# Patient Record
Sex: FEMALE | Race: WHITE | NOT HISPANIC OR LATINO | Employment: STUDENT | URBAN - METROPOLITAN AREA
[De-identification: names, ages, dates, MRNs, and addresses within clinical notes are randomized per-mention and may not be internally consistent; named-entity substitution may affect disease eponyms.]

---

## 2017-03-05 ENCOUNTER — HOSPITAL ENCOUNTER (INPATIENT)
Facility: OTHER | Age: 22
LOS: 2 days | Discharge: HOME | DRG: 872 | End: 2017-03-08
Attending: EMERGENCY MEDICINE | Admitting: HOSPITALIST
Payer: COMMERCIAL

## 2017-03-05 DIAGNOSIS — R50.9 FEVER AND CHILLS: ICD-10-CM

## 2017-03-05 DIAGNOSIS — R11.0 NAUSEA: ICD-10-CM

## 2017-03-05 DIAGNOSIS — A41.9 SEPSIS SECONDARY TO UTI: ICD-10-CM

## 2017-03-05 DIAGNOSIS — N30.01 ACUTE CYSTITIS WITH HEMATURIA: Primary | ICD-10-CM

## 2017-03-05 DIAGNOSIS — N39.0 SEPSIS SECONDARY TO UTI: ICD-10-CM

## 2017-03-05 DIAGNOSIS — N10 ACUTE PYELONEPHRITIS: ICD-10-CM

## 2017-03-05 DIAGNOSIS — R00.0 TACHYCARDIA: ICD-10-CM

## 2017-03-05 LAB
ALBUMIN SERPL BCP-MCNC: 4.1 G/DL
ALP SERPL-CCNC: 80 U/L
ALT SERPL W/O P-5'-P-CCNC: 34 U/L
ANION GAP SERPL CALC-SCNC: 9 MMOL/L
AST SERPL-CCNC: 29 U/L
B-HCG UR QL: NEGATIVE
BACTERIA #/AREA URNS HPF: ABNORMAL /HPF
BASOPHILS # BLD AUTO: 0.05 K/UL
BASOPHILS NFR BLD: 0.6 %
BILIRUB SERPL-MCNC: 0.5 MG/DL
BILIRUB UR QL STRIP: NEGATIVE
BUN SERPL-MCNC: 9 MG/DL
CALCIUM SERPL-MCNC: 9.9 MG/DL
CHLORIDE SERPL-SCNC: 106 MMOL/L
CLARITY UR: ABNORMAL
CO2 SERPL-SCNC: 25 MMOL/L
COLOR UR: YELLOW
CREAT SERPL-MCNC: 0.9 MG/DL
CTP QC/QA: YES
DIFFERENTIAL METHOD: ABNORMAL
EOSINOPHIL # BLD AUTO: 0.1 K/UL
EOSINOPHIL NFR BLD: 0.7 %
ERYTHROCYTE [DISTWIDTH] IN BLOOD BY AUTOMATED COUNT: 13.8 %
EST. GFR  (AFRICAN AMERICAN): >60 ML/MIN/1.73 M^2
EST. GFR  (NON AFRICAN AMERICAN): >60 ML/MIN/1.73 M^2
GLUCOSE SERPL-MCNC: 96 MG/DL
GLUCOSE UR QL STRIP: NEGATIVE
HCT VFR BLD AUTO: 40.3 %
HGB BLD-MCNC: 13.4 G/DL
HGB UR QL STRIP: ABNORMAL
INR PPP: 0.9
KETONES UR QL STRIP: NEGATIVE
LEUKOCYTE ESTERASE UR QL STRIP: ABNORMAL
LIPASE SERPL-CCNC: 35 U/L
LYMPHOCYTES # BLD AUTO: 1.4 K/UL
LYMPHOCYTES NFR BLD: 15.3 %
MCH RBC QN AUTO: 29.1 PG
MCHC RBC AUTO-ENTMCNC: 33.3 %
MCV RBC AUTO: 87 FL
MICROSCOPIC COMMENT: ABNORMAL
MONOCYTES # BLD AUTO: 0.8 K/UL
MONOCYTES NFR BLD: 8.6 %
NEUTROPHILS # BLD AUTO: 6.6 K/UL
NEUTROPHILS NFR BLD: 74.5 %
NITRITE UR QL STRIP: NEGATIVE
NON-SQ EPI CELLS #/AREA URNS HPF: 2 /HPF
PH UR STRIP: 7 [PH] (ref 5–8)
PLATELET # BLD AUTO: 211 K/UL
PMV BLD AUTO: 10.9 FL
POTASSIUM SERPL-SCNC: 4.2 MMOL/L
PROT SERPL-MCNC: 7.8 G/DL
PROT UR QL STRIP: NEGATIVE
PROTHROMBIN TIME: 9.8 SEC
RBC # BLD AUTO: 4.61 M/UL
RBC #/AREA URNS HPF: 9 /HPF (ref 0–4)
SODIUM SERPL-SCNC: 140 MMOL/L
SP GR UR STRIP: 1.01 (ref 1–1.03)
SQUAMOUS #/AREA URNS HPF: 3 /HPF
URN SPEC COLLECT METH UR: ABNORMAL
UROBILINOGEN UR STRIP-ACNC: NEGATIVE EU/DL
WBC # BLD AUTO: 8.8 K/UL
WBC #/AREA URNS HPF: 33 /HPF (ref 0–5)
WBC CLUMPS URNS QL MICRO: ABNORMAL

## 2017-03-05 PROCEDURE — 87088 URINE BACTERIA CULTURE: CPT

## 2017-03-05 PROCEDURE — 25500020 PHARM REV CODE 255: Performed by: EMERGENCY MEDICINE

## 2017-03-05 PROCEDURE — 96375 TX/PRO/DX INJ NEW DRUG ADDON: CPT

## 2017-03-05 PROCEDURE — 81000 URINALYSIS NONAUTO W/SCOPE: CPT

## 2017-03-05 PROCEDURE — 25000003 PHARM REV CODE 250: Performed by: INTERNAL MEDICINE

## 2017-03-05 PROCEDURE — 87186 SC STD MICRODIL/AGAR DIL: CPT

## 2017-03-05 PROCEDURE — 63600175 PHARM REV CODE 636 W HCPCS: Performed by: EMERGENCY MEDICINE

## 2017-03-05 PROCEDURE — 80053 COMPREHEN METABOLIC PANEL: CPT

## 2017-03-05 PROCEDURE — 87040 BLOOD CULTURE FOR BACTERIA: CPT

## 2017-03-05 PROCEDURE — 85610 PROTHROMBIN TIME: CPT

## 2017-03-05 PROCEDURE — G0378 HOSPITAL OBSERVATION PER HR: HCPCS

## 2017-03-05 PROCEDURE — 99285 EMERGENCY DEPT VISIT HI MDM: CPT | Mod: 25

## 2017-03-05 PROCEDURE — 25000003 PHARM REV CODE 250: Performed by: EMERGENCY MEDICINE

## 2017-03-05 PROCEDURE — 87086 URINE CULTURE/COLONY COUNT: CPT

## 2017-03-05 PROCEDURE — 96367 TX/PROPH/DG ADDL SEQ IV INF: CPT

## 2017-03-05 PROCEDURE — 96376 TX/PRO/DX INJ SAME DRUG ADON: CPT

## 2017-03-05 PROCEDURE — 96361 HYDRATE IV INFUSION ADD-ON: CPT

## 2017-03-05 PROCEDURE — 85025 COMPLETE CBC W/AUTO DIFF WBC: CPT

## 2017-03-05 PROCEDURE — 96365 THER/PROPH/DIAG IV INF INIT: CPT

## 2017-03-05 PROCEDURE — 83690 ASSAY OF LIPASE: CPT

## 2017-03-05 PROCEDURE — 81025 URINE PREGNANCY TEST: CPT | Performed by: EMERGENCY MEDICINE

## 2017-03-05 PROCEDURE — 87077 CULTURE AEROBIC IDENTIFY: CPT

## 2017-03-05 PROCEDURE — 99223 1ST HOSP IP/OBS HIGH 75: CPT | Mod: ,,, | Performed by: HOSPITALIST

## 2017-03-05 RX ORDER — OXYCODONE HYDROCHLORIDE 5 MG/1
5 TABLET ORAL EVERY 4 HOURS PRN
Status: DISCONTINUED | OUTPATIENT
Start: 2017-03-05 | End: 2017-03-08 | Stop reason: HOSPADM

## 2017-03-05 RX ORDER — MORPHINE SULFATE 4 MG/ML
4 INJECTION, SOLUTION INTRAMUSCULAR; INTRAVENOUS EVERY 6 HOURS PRN
Status: DISCONTINUED | OUTPATIENT
Start: 2017-03-05 | End: 2017-03-06

## 2017-03-05 RX ORDER — NORGESTIMATE AND ETHINYL ESTRADIOL 7DAYSX3 28
1 KIT ORAL DAILY
COMMUNITY

## 2017-03-05 RX ORDER — ONDANSETRON 2 MG/ML
8 INJECTION INTRAMUSCULAR; INTRAVENOUS EVERY 8 HOURS PRN
Status: DISCONTINUED | OUTPATIENT
Start: 2017-03-05 | End: 2017-03-08 | Stop reason: HOSPADM

## 2017-03-05 RX ORDER — ACETAMINOPHEN 500 MG
1000 TABLET ORAL
Status: COMPLETED | OUTPATIENT
Start: 2017-03-05 | End: 2017-03-05

## 2017-03-05 RX ORDER — ACETAMINOPHEN 325 MG/1
650 TABLET ORAL EVERY 6 HOURS PRN
Status: DISCONTINUED | OUTPATIENT
Start: 2017-03-05 | End: 2017-03-08 | Stop reason: HOSPADM

## 2017-03-05 RX ORDER — CEFAZOLIN SODIUM 1 G/50ML
1 SOLUTION INTRAVENOUS
Status: COMPLETED | OUTPATIENT
Start: 2017-03-05 | End: 2017-03-05

## 2017-03-05 RX ORDER — SODIUM CHLORIDE 9 MG/ML
INJECTION, SOLUTION INTRAVENOUS CONTINUOUS
Status: DISCONTINUED | OUTPATIENT
Start: 2017-03-05 | End: 2017-03-07

## 2017-03-05 RX ORDER — ONDANSETRON 2 MG/ML
4 INJECTION INTRAMUSCULAR; INTRAVENOUS
Status: COMPLETED | OUTPATIENT
Start: 2017-03-05 | End: 2017-03-05

## 2017-03-05 RX ORDER — HYDROMORPHONE HYDROCHLORIDE 1 MG/ML
0.5 INJECTION, SOLUTION INTRAMUSCULAR; INTRAVENOUS; SUBCUTANEOUS
Status: COMPLETED | OUTPATIENT
Start: 2017-03-05 | End: 2017-03-05

## 2017-03-05 RX ORDER — MORPHINE SULFATE 2 MG/ML
2 INJECTION, SOLUTION INTRAMUSCULAR; INTRAVENOUS
Status: COMPLETED | OUTPATIENT
Start: 2017-03-05 | End: 2017-03-05

## 2017-03-05 RX ORDER — HYDROMORPHONE HYDROCHLORIDE 1 MG/ML
0.5 INJECTION, SOLUTION INTRAMUSCULAR; INTRAVENOUS; SUBCUTANEOUS
Status: DISCONTINUED | OUTPATIENT
Start: 2017-03-05 | End: 2017-03-05

## 2017-03-05 RX ORDER — CIPROFLOXACIN 2 MG/ML
400 INJECTION, SOLUTION INTRAVENOUS
Status: COMPLETED | OUTPATIENT
Start: 2017-03-05 | End: 2017-03-05

## 2017-03-05 RX ORDER — IBUPROFEN 400 MG/1
800 TABLET ORAL
Status: COMPLETED | OUTPATIENT
Start: 2017-03-05 | End: 2017-03-05

## 2017-03-05 RX ORDER — CIPROFLOXACIN 2 MG/ML
400 INJECTION, SOLUTION INTRAVENOUS
Status: DISCONTINUED | OUTPATIENT
Start: 2017-03-06 | End: 2017-03-06

## 2017-03-05 RX ADMIN — MORPHINE SULFATE 2 MG: 2 INJECTION, SOLUTION INTRAMUSCULAR; INTRAVENOUS at 05:03

## 2017-03-05 RX ADMIN — IBUPROFEN 800 MG: 400 TABLET, FILM COATED ORAL at 09:03

## 2017-03-05 RX ADMIN — MORPHINE SULFATE 2 MG: 2 INJECTION, SOLUTION INTRAMUSCULAR; INTRAVENOUS at 02:03

## 2017-03-05 RX ADMIN — ONDANSETRON 4 MG: 2 INJECTION, SOLUTION INTRAMUSCULAR; INTRAVENOUS at 09:03

## 2017-03-05 RX ADMIN — ACETAMINOPHEN 1000 MG: 500 TABLET ORAL at 06:03

## 2017-03-05 RX ADMIN — CEFAZOLIN SODIUM 1 G: 1 SOLUTION INTRAVENOUS at 09:03

## 2017-03-05 RX ADMIN — SODIUM CHLORIDE 1000 ML: 0.9 INJECTION, SOLUTION INTRAVENOUS at 02:03

## 2017-03-05 RX ADMIN — ONDANSETRON 4 MG: 2 INJECTION INTRAMUSCULAR; INTRAVENOUS at 02:03

## 2017-03-05 RX ADMIN — CIPROFLOXACIN 400 MG: 2 INJECTION, SOLUTION INTRAVENOUS at 07:03

## 2017-03-05 RX ADMIN — OXYCODONE HYDROCHLORIDE 5 MG: 5 TABLET ORAL at 11:03

## 2017-03-05 RX ADMIN — HYDROMORPHONE HYDROCHLORIDE 0.5 MG: 1 INJECTION, SOLUTION INTRAMUSCULAR; INTRAVENOUS; SUBCUTANEOUS at 06:03

## 2017-03-05 RX ADMIN — IOHEXOL 75 ML: 350 INJECTION, SOLUTION INTRAVENOUS at 04:03

## 2017-03-05 RX ADMIN — HYDROMORPHONE HYDROCHLORIDE 0.5 MG: 1 INJECTION, SOLUTION INTRAMUSCULAR; INTRAVENOUS; SUBCUTANEOUS at 07:03

## 2017-03-05 RX ADMIN — SODIUM CHLORIDE: 0.9 INJECTION, SOLUTION INTRAVENOUS at 11:03

## 2017-03-05 NOTE — ED NOTES
Rounding on the patient has been done. she has been updated on the plan of care and her current status. Pain was assessed and is currently a 5/10. Comfort positioning and restroom needs were addressed. Necessary items were placed with in her reach and she was advised when a reassessment would take place. The call bell remains at the bedside for any additional patient needs. The patient is resting comfortably on the stretcher, respirations are even and unlabored, skin warm and dry. Will continue to monitor.

## 2017-03-05 NOTE — ED TRIAGE NOTES
Pt states diffuse abdominal pain since yesterday afternoon - pt woke at 0300 with severe right sided abdominal pain and some low back pain on the right side - no appetite and nauseated- states some constipation over last 2 days - abdomen hurt worse while driving here and going over bumps

## 2017-03-05 NOTE — ED PROVIDER NOTES
Encounter Date: 3/5/2017    SCRIBE #1 NOTE: I, Kang Saab, am scribing for, and in the presence of, Dr. Camp.       History     Chief Complaint   Patient presents with    Abdominal Pain     pt reports RLQ pain that started last night with a fever last night that has resolved at this time; nausea but denies any vomiting/diarrhea; denies any dysuria/hematuria; pt was seen at [facility] and was told to come to ED to r/o appendicitis     Review of patient's allergies indicates:  No Known Allergies  HPI Comments: Time seen by provider: 2:20 PM    This is a 21 y.o. female who presents to the ED with a chief complaint of RLQ pain. The patient states that the pain woke her from sleep last night. She reports that last night her pain was generalized but today has became localized to her RLQ. Her pain is described as sharp and rated at an 8/10 in severity currently. She notes it has worsened since onset. The patient also complains of nausea and constipation since onset last night. She reports a hx of celiac disease but denies any similarity to past episodes. The patient denies any vomiting, dysuria, urinary frequency, or urinary urgency. She was sent from an urgent care clinic with concerns for appendicitis. She reports no hx of ovarian cysts. No known allergies reported. Past dx of pyelonephritis noted.     The history is provided by the patient.     Past Medical History:   Diagnosis Date    Celiac disease      Past Surgical History:   Procedure Laterality Date    LEG SURGERY       History reviewed. No pertinent family history.  Social History   Substance Use Topics    Smoking status: Never Smoker    Smokeless tobacco: None    Alcohol use Yes      Comment: social     Review of Systems   Constitutional: Negative for chills and fever.   HENT: Negative for congestion and sore throat.    Eyes: Negative for photophobia and redness.   Respiratory: Negative for cough and shortness of breath.    Cardiovascular:  Negative for chest pain.   Gastrointestinal: Positive for abdominal pain, constipation and nausea. Negative for vomiting.   Genitourinary: Negative for dysuria, frequency and urgency.   Musculoskeletal: Negative for back pain.   Skin: Negative for rash.   Neurological: Negative for weakness, light-headedness and headaches.   Psychiatric/Behavioral: Negative for confusion.       Physical Exam   Initial Vitals   BP Pulse Resp Temp SpO2   03/05/17 1347 03/05/17 1347 03/05/17 1347 03/05/17 1347 03/05/17 1347   121/89 121 18 98.4 °F (36.9 °C) 100 %     Physical Exam    Nursing note and vitals reviewed.  Constitutional: She appears well-developed and well-nourished. She is not diaphoretic. No distress.   HENT:   Head: Normocephalic and atraumatic.   Mouth/Throat: Oropharynx is clear and moist.   Eyes: Conjunctivae and EOM are normal. Pupils are equal, round, and reactive to light. No scleral icterus.   Neck: Normal range of motion. Neck supple.   Cardiovascular: Normal rate, regular rhythm, S1 normal, S2 normal and normal heart sounds. Exam reveals no gallop and no friction rub.    No murmur heard.  Pulmonary/Chest: Breath sounds normal. No respiratory distress. She has no wheezes. She has no rhonchi. She has no rales.   Abdominal: Soft. Bowel sounds are normal. There is no rebound and no guarding.   TTP at McBurney's Point.    Musculoskeletal: Normal range of motion. She exhibits no edema or tenderness.   No lower extremity edema.    Lymphadenopathy:     She has no cervical adenopathy.   Neurological: She is alert and oriented to person, place, and time.   Skin: Skin is warm and dry. No rash noted. No pallor.   Psychiatric: She has a normal mood and affect. Her behavior is normal. Judgment and thought content normal.         ED Course   Procedures  Labs Reviewed   URINALYSIS - Abnormal; Notable for the following:        Result Value    Appearance, UA Hazy (*)     Occult Blood UA 1+ (*)     Leukocytes, UA Trace (*)      All other components within normal limits   CBC W/ AUTO DIFFERENTIAL - Abnormal; Notable for the following:     Gran% 74.5 (*)     Lymph% 15.3 (*)     All other components within normal limits   URINALYSIS MICROSCOPIC - Abnormal; Notable for the following:     RBC, UA 9 (*)     WBC, UA 33 (*)     WBC Clumps, UA Moderate (*)     Bacteria, UA Moderate (*)     Non-Squam Epith 2 (*)     All other components within normal limits   CULTURE, URINE   CULTURE, BLOOD   CULTURE, BLOOD   COMPREHENSIVE METABOLIC PANEL   LIPASE   PROTIME-INR   POCT URINE PREGNANCY        Imaging Results         US Pelvis Comp with Transvag NON-OB (xpd) (Final result) Result time:  03/05/17 19:41:08    Procedure changed from US Pelvis Complete Non OB        Final result by Nick Gutierrez MD (03/05/17 19:41:08)    Impression:       No definitive corpus luteum cyst as suggested on the previous CT scan of the abdomen and pelvis.    Small amount of fluid within the pelvis, otherwise unremarkable pelvic ultrasound.              Electronically signed by: NICK GUTIERREZ MD  Date:     03/05/17  Time:    19:41     Narrative:    Exam: [id number]  03/05/17  18:34:05 [id number] ([facility]) : US PELVIS COMP WITH TRANSVAG NON-OB (XPD)    Technique:    Transabdominal and transvaginal pelvic ultrasound was performed.    Comparison:    CT scan of the abdomen and pelvis dated 3/5/11    Findings:      The uterus measures 7.4 x 4.3 x 4.6 cm.  The endometrial stripe measures 6 mm.  No intrauterine gestation is identified.  The cervix is unremarkable.    The right ovary measures 3.0 x 2.0 x 2.4 cm.  The left ovary measures 3.4 x 1.7 x 2.4 cm.  There is normal arterial and venous flow to both ovaries.    Small amount of free fluid is present in the pelvis.            CT Abdomen Pelvis With Contrast (Final result)    Abnormal Result time:  03/05/17 17:58:28    Final result by Nick Gutierrez MD (03/05/17 17:58:28)    Impression:       No CT evidence of acute appendicitis.    Mild loss of  the corticomedullary differentiation involving the right kidney with associated right-sided uroepithelial thickening.  The findings are most suggestive of pyelonephritis.  Suggest clinical correlation.    Small right corpus luteum cyst.    Report has been flagged in the EPIC medical record system.          Electronically signed by: BULL HU MD  Date:     03/05/17  Time:    17:58     Narrative:    Exam: [id number]  03/05/17  16:16:02 [id number] ([facility]) : CT ABDOMEN PELVIS WITH CONTRAST    Technique:    Axial CT Scan of the abdomen and pelvis was performed from the lung base to the public symphysis after the intravenous administration of  75 cc of Omni 350. Coronal and Sagittal reformats were obtained.     Comparison:    CT scan of the abdomen and pelvis dated 10/21/14.    Findings:      There is a 5 mm pleural-based nodule in the right lung base.  The remaining lung bases are within normal limits.  The heart is normal in appearance.  No pericardial effusions identified.  The vessels are unremarkable.  There is no evidence of lymphadenopathy in the abdomen or pelvis.    The esophagus, stomach, duodenum, and small bowel are within normal limits.  The appendix is within normal limits.  The large bowel is normal in appearance.    The liver, gallbladder, and biliary system are within normal limits.  The spleen, pancreas, and adrenal glands are within normal limits.    There is mild loss of the corticomedullary differentiation involving the right kidney.  There is uroepithelial thickening involving the right ureter.  The left kidney and left ureter are within normal limits.  The urinary bladder is distended.  The right adnexa is prominent with questionable right corpus luteum cyst.    There is no evidence of free fluid in the abdomen or pelvis.  There is no evidence of free air.  There is no evidence of pneumatosis    There is mild scoliosis of the thoracolumbar spine.  No fractures identified.  The abdominal wall is  unremarkable.                 Medical Decision Making:   ED Management:  6:38 PM: Upon revaluation, the patient's temp was 103. ordered tylenol, 0.25 of Dilaudid, and 400 mg IV Cipro, and pelvic US secondary to CT scan with right adnexal fullness.     9:03 PM: Consulted and discussed the patient's case with the moonlighter who will see the patient in the ED.            Scribe Attestation:   Scribe #1: I performed the above scribed service and the documentation accurately describes the services I performed. I attest to the accuracy of the note.    Attending Attestation:           Physician Attestation for Scribe:  Physician Attestation Statement for Scribe #1: I, Dr. Cedeno, reviewed documentation, as scribed by Kang Saab in my presence, and it is both accurate and complete.         Attending ED Notes:   Emergent evaluation a 21-year-old female with complaint of right lower quadrant abdominal pain.  Patient is initially afebrile with right lower quadrant abdominal pain and McBurney's point.  Patient has no elevation of white blood cell count.  H&H within normal limits.  No acute findings on CMP.  Urinary analysis reveals moderate white blood cell clumps moderate bacteria.  CT scan reveals no evidence of acute appendicitis.  CT does reveal right-sided pyelonephritis with uroepithelial thickening.  Possible small right corpus luteum cyst.  Ultrasound of the pelvis is without any acute findings.  No visualization of corpus luteum cyst.  While in the emergency department patient became tachycardic and developed a fever 103.  Patient received both Tylenol and ibuprofen for control of fever.  Patient also received IV pain medicine for control.  Despite treatment in emergency department patient has intractable pain and nausea.  The patient is extensively counseled on her diagnosis and treatment and admitted in stable condition.      9:30pm Consulted and discussed patient with the moonlighter on call who will admit the  patient.           ED Course     Clinical Impression:     1. Acute cystitis with hematuria    2. Fever and chills    3. Tachycardia    4. Nausea                Gelacio Boggs MD  03/05/17 5125

## 2017-03-05 NOTE — IP AVS SNAPSHOT
[facility] Location ([facility])  [address] [place], [place] [address]  Phone: [phone]           Patient Discharge Instructions     Our goal is to set you up for success. This packet includes information on your condition, medications, and your home care. It will help you to care for yourself so you don't get sicker and need to go back to the hospital.     Please ask your nurse if you have any questions.        There are many details to remember when preparing to leave the hospital. Here is what you will need to do:    1. Take your medicine. If you are prescribed medications, review your Medication List in the following pages. You may have new medications to  at the pharmacy and others that you'll need to stop taking. Review the instructions for how and when to take your medications. Talk with your doctor or nurses if you are unsure of what to do.     2. Go to your follow-up appointments. Specific follow-up information is listed in the following pages. Your may be contacted by a transition nurse or clinical provider about future appointments. Be sure we have all of the phone numbers to reach you, if needed. Please contact your provider's office if you are unable to make an appointment.     3. Watch for warning signs. Your doctor or nurse will give you detailed warning signs to watch for and when to call for assistance. These instructions may also include educational information about your condition. If you experience any of warning signs to your health, call your doctor.               [facility]  Unless otherwise directed by your provider, please contact [facility], our nurse care line that is available for 24/7 assistance.     [phone] (toll-free)    Registered nurses in the [facility] provide clinical advisement, health education, appointment booking, and other advisory services.                    ** Verify the list of medication(s) below is accurate and up to  date. Carry this with you in case of emergency. If your medications have changed, please notify your healthcare provider.             Medication List      START taking these medications        Additional Info                      ciprofloxacin HCl 500 MG tablet   Commonly known as:  CIPRO   Quantity:  20 tablet   Refills:  0   Dose:  500 mg   Indications:  UTI pyelonephritis    Last time this was given:  500 mg on 3/8/2017 11:55 AM   Instructions:  Take 1 tablet (500 mg total) by mouth every 12 (twelve) hours.     Begin Date    AM    Noon    PM    Bedtime         CONTINUE taking these medications        Additional Info                      norgestimate-ethinyl estradiol 0.18/0.215/0.25 mg-35 mcg (28) tablet   Commonly known as:  ORTHO TRI-CYCLEN,TRI-SPRINTEC   Refills:  0   Dose:  1 tablet    Instructions:  Take 1 tablet by mouth once daily.     Begin Date    AM    Noon    PM    Bedtime            Where to Get Your Medications      These medications were sent to [facility] [place], [place] - [address]  [address], [place] [address]     Phone:  [phone]     ciprofloxacin HCl 500 MG tablet                  Please bring to all follow up appointments:    1. A copy of your discharge instructions.  2. All medicines you are currently taking in their original bottles.  3. Identification and insurance card.    Please arrive 15 minutes ahead of scheduled appointment time.    Please call 24 hours in advance if you must reschedule your appointment and/or time.        Follow-up Information     Follow up with [facility]. Schedule an appointment as soon as possible for a visit in 3 days.    Contact information:    [place] [address] [phone]          Discharge Instructions     Future Orders    Call MD for:  difficulty breathing or increased cough     Call MD for:  increased confusion or weakness     Call MD for:  persistent  "dizziness, light-headedness, or visual disturbances     Call MD for:  persistent nausea and vomiting or diarrhea     Call MD for:  severe persistent headache     Call MD for:  severe uncontrolled pain     Call MD for:  temperature >100.4     Call MD for:  worsening rash     Diet general     Questions:    Total calories:      Fat restriction, if any:      Protein restriction, if any:      Na restriction, if any:      Fluid restriction:      Additional restrictions:        Discharge References/Attachments     PYELONEPHRITIS, FEMALE (ADULT) (ENGLISH)    BLADDER INFECTION, FEMALE (ADULT) (ENGLISH)        Primary Diagnosis     Your primary diagnosis was:  Urinary Sepsis      Admission Information     Date & Time Provider Department CSN    3/5/2017  2:12 PM Bird Pond MD [facility] [id number]      Care Providers     Provider Role Specialty Primary office phone    iBrd Pond MD Attending Provider Hospitalist [phone]      Your Vitals Were     BP Pulse Temp Resp Height Weight    112/64 (BP Location: Left arm, Patient Position: Lying, BP Method: Automatic) 77 98.5 °F (36.9 °C) (Oral) 18 5' 10" (1.778 m) 68 kg (150 lb)    Last Period SpO2 BMI          02/19/2017 98% 21.52 kg/m2        Recent Lab Values     No lab values to display.      Pending Labs     Order Current Status    Blood Culture #1 **CANNOT BE ORDERED STAT** Preliminary result    Blood Culture #2 **CANNOT BE ORDERED STAT** Preliminary result    Blood culture Preliminary result    Blood culture Preliminary result      Allergies as of 3/8/2017     No Known Allergies      Advance Directives     An advance directive is a document which, in the event you are no longer able to make decisions for yourself, tells your healthcare team what kind of treatment you do or do not want to receive, or who you would like to make those decisions for you.  If you do not currently have an advance directive, [facility] encourages you to create one.  For more " information call:  [phone]), [phone] ([phone]),  or log on to [email or web address].        Language Assistance Services     ATTENTION: Language assistance services are available, free of charge. Please call [phone].      ATENCIÓN: Si habla español, tiene a fragoso disposición servicios gratuitos de asistencia lingüística. Llame al [phone].     CHÚ Ý: N?u b?n nói Ti?ng Vi?t, có các d?ch v? h? tr? ngôn ng? mi?n phí dành cho b?n. G?i s? [phone].         [facility] complies with applicable Federal civil rights laws and does not discriminate on the basis of race, color, national origin, age, disability, or sex.

## 2017-03-06 PROBLEM — N39.0 SEPSIS SECONDARY TO UTI: Status: ACTIVE | Noted: 2017-03-06

## 2017-03-06 PROBLEM — N10 ACUTE PYELONEPHRITIS: Status: ACTIVE | Noted: 2017-03-06

## 2017-03-06 PROBLEM — A41.9 SEPSIS SECONDARY TO UTI: Status: ACTIVE | Noted: 2017-03-06

## 2017-03-06 LAB
ALBUMIN SERPL BCP-MCNC: 3.7 G/DL
ALP SERPL-CCNC: 74 U/L
ALT SERPL W/O P-5'-P-CCNC: 34 U/L
ANION GAP SERPL CALC-SCNC: 8 MMOL/L
AST SERPL-CCNC: 27 U/L
BASOPHILS # BLD AUTO: 0.02 K/UL
BASOPHILS NFR BLD: 0.2 %
BILIRUB SERPL-MCNC: 1.1 MG/DL
BUN SERPL-MCNC: 9 MG/DL
CALCIUM SERPL-MCNC: 9.5 MG/DL
CHLORIDE SERPL-SCNC: 105 MMOL/L
CO2 SERPL-SCNC: 24 MMOL/L
CREAT SERPL-MCNC: 1.1 MG/DL
DIFFERENTIAL METHOD: ABNORMAL
EOSINOPHIL # BLD AUTO: 0 K/UL
EOSINOPHIL NFR BLD: 0.1 %
ERYTHROCYTE [DISTWIDTH] IN BLOOD BY AUTOMATED COUNT: 13.9 %
EST. GFR  (AFRICAN AMERICAN): >60 ML/MIN/1.73 M^2
EST. GFR  (NON AFRICAN AMERICAN): >60 ML/MIN/1.73 M^2
GLUCOSE SERPL-MCNC: 93 MG/DL
HCT VFR BLD AUTO: 39.7 %
HGB BLD-MCNC: 12.9 G/DL
LYMPHOCYTES # BLD AUTO: 1.3 K/UL
LYMPHOCYTES NFR BLD: 12.5 %
MCH RBC QN AUTO: 28.3 PG
MCHC RBC AUTO-ENTMCNC: 32.5 %
MCV RBC AUTO: 87 FL
MONOCYTES # BLD AUTO: 0.8 K/UL
MONOCYTES NFR BLD: 8.1 %
NEUTROPHILS # BLD AUTO: 7.9 K/UL
NEUTROPHILS NFR BLD: 78.7 %
PLATELET # BLD AUTO: 168 K/UL
PMV BLD AUTO: 11.1 FL
POTASSIUM SERPL-SCNC: 4.6 MMOL/L
PROT SERPL-MCNC: 7.2 G/DL
RBC # BLD AUTO: 4.56 M/UL
SODIUM SERPL-SCNC: 137 MMOL/L
WBC # BLD AUTO: 10.08 K/UL

## 2017-03-06 PROCEDURE — 11000001 HC ACUTE MED/SURG PRIVATE ROOM

## 2017-03-06 PROCEDURE — 63600175 PHARM REV CODE 636 W HCPCS: Performed by: HOSPITALIST

## 2017-03-06 PROCEDURE — 63600175 PHARM REV CODE 636 W HCPCS: Performed by: INTERNAL MEDICINE

## 2017-03-06 PROCEDURE — 80053 COMPREHEN METABOLIC PANEL: CPT

## 2017-03-06 PROCEDURE — 87040 BLOOD CULTURE FOR BACTERIA: CPT | Mod: 59

## 2017-03-06 PROCEDURE — 25000003 PHARM REV CODE 250: Performed by: HOSPITALIST

## 2017-03-06 PROCEDURE — 85025 COMPLETE CBC W/AUTO DIFF WBC: CPT

## 2017-03-06 PROCEDURE — 36415 COLL VENOUS BLD VENIPUNCTURE: CPT

## 2017-03-06 PROCEDURE — 25000003 PHARM REV CODE 250: Performed by: INTERNAL MEDICINE

## 2017-03-06 RX ORDER — IBUPROFEN 600 MG/1
600 TABLET ORAL ONCE
Status: COMPLETED | OUTPATIENT
Start: 2017-03-06 | End: 2017-03-06

## 2017-03-06 RX ORDER — CEFEPIME HYDROCHLORIDE 2 G/50ML
2 INJECTION, SOLUTION INTRAVENOUS
Status: DISCONTINUED | OUTPATIENT
Start: 2017-03-06 | End: 2017-03-08 | Stop reason: HOSPADM

## 2017-03-06 RX ORDER — ENOXAPARIN SODIUM 100 MG/ML
40 INJECTION SUBCUTANEOUS EVERY 24 HOURS
Status: DISCONTINUED | OUTPATIENT
Start: 2017-03-06 | End: 2017-03-08 | Stop reason: HOSPADM

## 2017-03-06 RX ORDER — HYDROMORPHONE HYDROCHLORIDE 1 MG/ML
0.5 INJECTION, SOLUTION INTRAMUSCULAR; INTRAVENOUS; SUBCUTANEOUS EVERY 6 HOURS PRN
Status: DISCONTINUED | OUTPATIENT
Start: 2017-03-06 | End: 2017-03-08 | Stop reason: HOSPADM

## 2017-03-06 RX ADMIN — OXYCODONE HYDROCHLORIDE 5 MG: 5 TABLET ORAL at 11:03

## 2017-03-06 RX ADMIN — CEFEPIME HYDROCHLORIDE 2 G: 2 INJECTION, SOLUTION INTRAVENOUS at 11:03

## 2017-03-06 RX ADMIN — CIPROFLOXACIN 400 MG: 2 INJECTION, SOLUTION INTRAVENOUS at 10:03

## 2017-03-06 RX ADMIN — OXYCODONE HYDROCHLORIDE 5 MG: 5 TABLET ORAL at 05:03

## 2017-03-06 RX ADMIN — ONDANSETRON 8 MG: 2 INJECTION INTRAMUSCULAR; INTRAVENOUS at 11:03

## 2017-03-06 RX ADMIN — ENOXAPARIN SODIUM 40 MG: 100 INJECTION SUBCUTANEOUS at 11:03

## 2017-03-06 RX ADMIN — IBUPROFEN 600 MG: 600 TABLET, FILM COATED ORAL at 05:03

## 2017-03-06 RX ADMIN — ACETAMINOPHEN 650 MG: 325 TABLET ORAL at 07:03

## 2017-03-06 RX ADMIN — SODIUM CHLORIDE 1000 ML: 0.9 INJECTION, SOLUTION INTRAVENOUS at 12:03

## 2017-03-06 RX ADMIN — ONDANSETRON 8 MG: 2 INJECTION INTRAMUSCULAR; INTRAVENOUS at 05:03

## 2017-03-06 NOTE — NURSING
No significant events overnight. Remains free from fall, injury, and skin breakdown. Voiding independently. VSS on RA throughout the night. Pain well controlled with PO meds. PRN antiemetic administered and well tolerated. Plan of care reviewed with patient and all questions answered. Bed low, locked w/ bed alarm on. Call light within reach. Purposeful rounding performed. Resting comfortably in bed, no other complaints at this time.

## 2017-03-06 NOTE — PLAN OF CARE
SW met with pt at bedside to complete discharge assessment.  Mother, Toshia Doss [phone] is POA no living will.  Pt is a student at [employer] and lives with 4 roommates.  No needs identified at this time.     03/06/17 1011   Discharge Assessment   Assessment Type Discharge Planning Assessment   Confirmed/corrected address and phone number on facesheet? Yes   Assessment information obtained from? Patient   Communicated expected length of stay with patient/caregiver no   Prior to hospitilization cognitive status: Alert/Oriented   Current cognitive status: Alert/Oriented   Current Functional Status: Independent   Arrived From (Urgent Care)   Lives With friend(s)   Able to Return to Prior Arrangements yes   Is patient able to care for self after discharge? Yes   How many people do you have in your home that can help with your care after discharge? 1   Who are your caregiver(s) and their phone number(s)? (Jessi Mitchell, friend, [phone])   Patient's perception of discharge disposition home or selfcare   Readmission Within The Last 30 Days no previous admission in last 30 days   Patient currently being followed by outpatient case management? No   Patient currently receives home health services? No   Does the patient currently use HME? No   Patient currently receives private duty nursing? No   Patient currently receives any other outside agency services? No   Do you have any problems affording any of your prescribed medications? No   Is the patient taking medications as prescribed? yes   Do you have any financial concerns preventing you from receiving the healthcare you need? No   Does the patient have transportation to healthcare appointments? Yes   Transportation Available (friend and personal transportation)   On Dialysis? No   Does the patient receive services at the Coumadin Clinic? No   Are there any open cases? No   Discharge Plan A Home   Patient/Family In Agreement With Plan yes

## 2017-03-06 NOTE — H&P
History & Physical  Hospital Medicine      SUBJECTIVE:     Chief Complaint/Reason for Admission: abdominal pain     History of Present Illness:  Ms. Lisa Pike is a 21 y.o. with a past medical history of celiac disease, prior pyelonephritis/UTI, here with abdominal pain. She states that yesterday she began having sharp , initially intermittent, then constant pain on the right side of her abdomen. She states that the pain became worse overnight, and then became constant. She states that this morning, she went to the store and on her arrival home , felt febrile, took her temperature which resulted as 100.7. She states she felt chills , but no rigors. She complains of nausea, but no emesis.  She states that she initially presented to an urgent care today, but was referred to the ER.     Presently she states that her pain is manageable and her nausea has subsided significantly.         ED course : Tylenol 1000mg  /  Ancef 1gram / Cipro 400mg / Dialudid 0.5mg x 2 / Ibuprofen 800mg / Morphine 2mg x 2 / Zofran 4mg x 2 / 1000 cc 0.9% NaCL   ED vitals:   Initial Vitals   BP Pulse Resp Temp SpO2   03/05/17 1347 03/05/17 1347 03/05/17 1347 03/05/17 1347 03/05/17 1347   121/89 121 18 98.4 °F (36.9 °C) 100 %               Review of patient's allergies indicates:  No Known Allergies    Past Medical History:   Diagnosis Date    Celiac disease        Past Surgical History:   Procedure Laterality Date    LEG SURGERY         History reviewed. No pertinent family history.     Social History     Social History    Marital status: Single     Spouse name: N/A    Number of children: N/A    Years of education: N/A     Social History Main Topics    Smoking status: Never Smoker    Smokeless tobacco: None    Alcohol use Yes      Comment: social    Drug use: No    Sexual activity: Not Asked     Other Topics Concern    None     Social History Narrative       Review of Systems:  Constitutional: + fever ,  no weight  changes.  Eyes: No visual changes or photophobia  HEENT: No nasal congestion or sore throat  Respiratory: No cough or shorness of breath  Cardiovascular: No chest pain or palpitations  Gastrointestinal: + nausea no vomiting, abdominal pain as above   Genitourinary: No hematuria or dysuria  Skin: No rash or pruritis  Hematologic/lymphatic: No easy bruising, bleeding or lymphadenopathy        OBJECTIVE:     Temp:  [98.4 °F (36.9 °C)-103 °F (39.4 °C)] 99.2 °F (37.3 °C)  Pulse:  [] 94  Resp:  [15-20] 17  SpO2:  [97 %-100 %] 97 %  BP: (104-139)/(57-89) 110/58  Body mass index is 21.52 kg/(m^2).     Physical Exam:  General appearance: Well developed, well nourished  HEENT:  Normocephalic, atruamatic, conjunctivae normal, sclarea anictric, PERRL, Mucus membranes moist,  Trachea midline , no JVD   Lungs: Clear to auscultation bilaterally and normal respiratory effort  Heart: Regular rate and rhythm, S1, S2 normal, no murmur, click, rub or gallop  Abdomen: Soft, mildly tender to palpation RLQ , non-distended; bowel sounds normal; no masses, no organomegaly. No CVA tenderness  Extremities: No cyanosis or clubbing. No edema  Pulses: 2+ and symmetric  Skin: Skin color, texture, turgor normal. No rashes or lesions        Laboratory: Reviewed and noted  where applicable- Please see chart for full lab data.  Urine wbc 33 , Moderate Bacteria   UPT negative       Diagnostic Tests:  EKG:     CXR:     CT A/ P   No CT evidence of acute appendicitis.    Mild loss of the corticomedullary differentiation involving the right kidney with associated right-sided uroepithelial thickening.  The findings are most suggestive of pyelonephritis.  Suggest clinical correlation.    Small right corpus luteum cyst.    Report has been flagged in the EPIC medical record system.    Pelvis US   No definitive corpus luteum cyst as suggested on the previous CT scan of the abdomen and pelvis.    Small amount of fluid within the pelvis, otherwise  unremarkable pelvic ultrasound.    ASSESSMENT/PLAN:     Assessment Ms. Lisa Pike is a 21 y.o. with a past medical history of celiac disease, prior pyelonephritis/UTI, here with pyelonephritis     Plan     1. Uncomplicated Pyelonephritis   Her pain and nausea , although improved from admission persist and thus will observe overnight   Had initial concern for corpus luteum cyst on CT but no evidence on ultrasound   CT findings suggestive for pyelonephritis   Received Cipro/Ancef in ER. Will continue Ciprofloxacin   Follow urine/blood cultures for sensitivity/specifity  IVF   Analgesia/Antiemetic  Will need op follow up     2. DVT prophylaxis  ambulatory              Code Status : full   Dispo: obs    Juan Manuel Camacho D.O  [facility]

## 2017-03-07 PROBLEM — R50.9 FEVER: Status: ACTIVE | Noted: 2017-03-07

## 2017-03-07 PROCEDURE — 99232 SBSQ HOSP IP/OBS MODERATE 35: CPT | Mod: ,,, | Performed by: HOSPITALIST

## 2017-03-07 PROCEDURE — 25000003 PHARM REV CODE 250: Performed by: INTERNAL MEDICINE

## 2017-03-07 PROCEDURE — 11000001 HC ACUTE MED/SURG PRIVATE ROOM

## 2017-03-07 PROCEDURE — 25000003 PHARM REV CODE 250: Performed by: HOSPITALIST

## 2017-03-07 PROCEDURE — 63600175 PHARM REV CODE 636 W HCPCS: Performed by: HOSPITALIST

## 2017-03-07 RX ORDER — IBUPROFEN 400 MG/1
400 TABLET ORAL EVERY 6 HOURS PRN
Status: DISCONTINUED | OUTPATIENT
Start: 2017-03-07 | End: 2017-03-08 | Stop reason: HOSPADM

## 2017-03-07 RX ORDER — SODIUM CHLORIDE 9 MG/ML
INJECTION, SOLUTION INTRAVENOUS CONTINUOUS
Status: ACTIVE | OUTPATIENT
Start: 2017-03-07 | End: 2017-03-08

## 2017-03-07 RX ADMIN — OXYCODONE HYDROCHLORIDE 5 MG: 5 TABLET ORAL at 11:03

## 2017-03-07 RX ADMIN — IBUPROFEN 400 MG: 400 TABLET, FILM COATED ORAL at 02:03

## 2017-03-07 RX ADMIN — SODIUM CHLORIDE: 0.9 INJECTION, SOLUTION INTRAVENOUS at 02:03

## 2017-03-07 RX ADMIN — SODIUM CHLORIDE: 0.9 INJECTION, SOLUTION INTRAVENOUS at 10:03

## 2017-03-07 RX ADMIN — ACETAMINOPHEN 650 MG: 325 TABLET ORAL at 05:03

## 2017-03-07 RX ADMIN — SODIUM CHLORIDE: 0.9 INJECTION, SOLUTION INTRAVENOUS at 12:03

## 2017-03-07 RX ADMIN — ENOXAPARIN SODIUM 40 MG: 100 INJECTION SUBCUTANEOUS at 11:03

## 2017-03-07 RX ADMIN — CEFEPIME HYDROCHLORIDE 2 G: 2 INJECTION, SOLUTION INTRAVENOUS at 10:03

## 2017-03-07 RX ADMIN — CEFEPIME HYDROCHLORIDE 2 G: 2 INJECTION, SOLUTION INTRAVENOUS at 11:03

## 2017-03-07 NOTE — NURSING
VSS; pt afebrile for most of shift. Independent with all adls; po fluids encouraged. Caloric intake encouraged. Pt reporting decreased appetite. Pt voided and ambulated with out difficulty. Needs assessed hourly; pt reporting headache; PRN motrin administered; med not effective at this time; awaiting return phone call from Dr. Pond. Safety interventions in place. Call bell in reach.

## 2017-03-07 NOTE — PROGRESS NOTES
VSS. Afebrile. Able to make wants and needs known to staff.  Voiding without difficulty. Some complaints of nausea noted. 8 mg of IV Zofran given. Elevated temp of 100.4 at 0513 noted. 650 mg of Acetaminophen given.  IV dressing clean, dry, and intact. Free from falls, injury, and trauma during this shift. Purposeful hourly rounding completed.

## 2017-03-07 NOTE — PLAN OF CARE
Problem: Patient Care Overview  Goal: Plan of Care Review  Outcome: Ongoing (interventions implemented as appropriate)  Pt with elevated temp twice this shift. MD notified and aware.Currently 99.9. States she is feeling much better. She is waiting on dinner with friends at bedside. Free of injury/falls. Denies pain at this time. IV fluids maintained. Bed in low, locked position, side rails up x 2. Call light within reach. Will continue to monitor.

## 2017-03-07 NOTE — PROGRESS NOTES
Progress Note  Hospital Medicine    Admit Date: 3/5/2017   LOS: 1 day     SUBJECTIVE:     Follow-up For:  Sepsis secondary to UTI    Interval History:    Pt new to me.  Denies flank pain, states still has some nausea and is not eating yet.  No vomiting.       Review of Systems:  Constitutional: No fever or chills  Respiratory: No cough or shorness of breath  Cardiovascular: No chest pain or palpitations  Gastrointestinal: No nausea or vomiting  Neurological: No confusion or weakness    OBJECTIVE:     Vital Signs Range (Last 24H):  Vitals:    03/07/17 0720   BP: (!) 105/58   Pulse: 78   Resp: (!) 22   Temp: 99.1 °F (37.3 °C)       Temp:  [98.2 °F (36.8 °C)-103.2 °F (39.6 °C)]   Pulse:  [68-99]   Resp:  [16-22]   BP: ()/(51-69)   SpO2:  [96 %-99 %]     I & O (Last 24H):  Intake/Output Summary (Last 24 hours) at 03/07/17 1053  Last data filed at 03/07/17 0500   Gross per 24 hour   Intake             2600 ml   Output              400 ml   Net             2200 ml       Physical Exam:  General appearance: Calm, NAD  Eyes:  Conjunctivae/corneas clear. PERRL.  Lungs: Normal respiratory effort,   clear to auscultation bilaterally   Heart: Regular rate and rhythm, S1, S2 normal,  Abdomen: Soft, non-tender non-distended; bowel sounds normal;   Extremities: No cyanosis or clubbing. No edema.    Skin: Skin color, texture, turgor normal. No rashes or lesions  Neurologic:  No focal numbness or weakness     Laboratory Data:  Reviewed and noted  where applicable- Please see chart for full lab data.    Medications:  Medication list was reviewed and changes noted under Assessment/Plan.    Diagnostic Results:  CT ABD PELVIS WITH 3/5/17:  No CT evidence of acute appendicitis.  Mild loss of the corticomedullary differentiation involving the right kidney with associated right-sided uroepithelial thickening.  The findings are most suggestive of pyelonephritis.  Suggest clinical correlation.  Small right corpus luteum cyst.  Report  has been flagged in the EPIC medical record system.      PELVIC US 3/5/17:  IMPRESSION:  No definitive corpus luteum cyst as suggested on the previous CT scan of the abdomen and pelvis.  Small amount of fluid within the pelvis, otherwise unremarkable pelvic ultrasound.    ASSESSMENT/PLAN:     Active Hospital Problems    Diagnosis  POA    *Sepsis secondary to UTI [A41.9, N39.0]  Yes    Fever [R50.9]  Yes    Acute pyelonephritis [N10]  Yes    Acute cystitis with hematuria [N30.01]  Yes      Resolved Hospital Problems    Diagnosis Date Resolved POA   No resolved problems to display.       UTI pyelonephritis  - Empiric Rocephin  - Culture E. Coli pending    [facility]

## 2017-03-08 VITALS
HEART RATE: 77 BPM | TEMPERATURE: 99 F | BODY MASS INDEX: 21.47 KG/M2 | SYSTOLIC BLOOD PRESSURE: 112 MMHG | RESPIRATION RATE: 18 BRPM | DIASTOLIC BLOOD PRESSURE: 64 MMHG | HEIGHT: 70 IN | WEIGHT: 150 LBS | OXYGEN SATURATION: 98 %

## 2017-03-08 PROBLEM — N39.0 SEPSIS SECONDARY TO UTI: Status: RESOLVED | Noted: 2017-03-06 | Resolved: 2017-03-08

## 2017-03-08 PROBLEM — A41.9 SEPSIS SECONDARY TO UTI: Status: RESOLVED | Noted: 2017-03-06 | Resolved: 2017-03-08

## 2017-03-08 LAB
ANION GAP SERPL CALC-SCNC: 5 MMOL/L
BACTERIA UR CULT: NORMAL
BUN SERPL-MCNC: 5 MG/DL
CALCIUM SERPL-MCNC: 8.7 MG/DL
CHLORIDE SERPL-SCNC: 109 MMOL/L
CO2 SERPL-SCNC: 25 MMOL/L
CREAT SERPL-MCNC: 0.8 MG/DL
EST. GFR  (AFRICAN AMERICAN): >60 ML/MIN/1.73 M^2
EST. GFR  (NON AFRICAN AMERICAN): >60 ML/MIN/1.73 M^2
GLUCOSE SERPL-MCNC: 94 MG/DL
MAGNESIUM SERPL-MCNC: 1.8 MG/DL
PHOSPHATE SERPL-MCNC: 2.4 MG/DL
POTASSIUM SERPL-SCNC: 4.1 MMOL/L
SODIUM SERPL-SCNC: 139 MMOL/L

## 2017-03-08 PROCEDURE — 63600175 PHARM REV CODE 636 W HCPCS: Performed by: HOSPITALIST

## 2017-03-08 PROCEDURE — 83735 ASSAY OF MAGNESIUM: CPT

## 2017-03-08 PROCEDURE — 36415 COLL VENOUS BLD VENIPUNCTURE: CPT

## 2017-03-08 PROCEDURE — 84100 ASSAY OF PHOSPHORUS: CPT

## 2017-03-08 PROCEDURE — 80048 BASIC METABOLIC PNL TOTAL CA: CPT

## 2017-03-08 PROCEDURE — 99238 HOSP IP/OBS DSCHRG MGMT 30/<: CPT | Mod: ,,, | Performed by: HOSPITALIST

## 2017-03-08 PROCEDURE — 25000003 PHARM REV CODE 250: Performed by: HOSPITALIST

## 2017-03-08 RX ORDER — CIPROFLOXACIN 500 MG/1
500 TABLET ORAL EVERY 12 HOURS
Status: DISCONTINUED | OUTPATIENT
Start: 2017-03-08 | End: 2017-03-08 | Stop reason: HOSPADM

## 2017-03-08 RX ORDER — CIPROFLOXACIN 500 MG/1
500 TABLET ORAL EVERY 12 HOURS
Qty: 20 TABLET | Refills: 0 | Status: SHIPPED | OUTPATIENT
Start: 2017-03-08 | End: 2017-03-18

## 2017-03-08 RX ADMIN — CEFEPIME HYDROCHLORIDE 2 G: 2 INJECTION, SOLUTION INTRAVENOUS at 11:03

## 2017-03-08 RX ADMIN — ENOXAPARIN SODIUM 40 MG: 100 INJECTION SUBCUTANEOUS at 11:03

## 2017-03-08 RX ADMIN — CIPROFLOXACIN 500 MG: 500 TABLET, FILM COATED ORAL at 11:03

## 2017-03-08 RX ADMIN — IBUPROFEN 400 MG: 400 TABLET, FILM COATED ORAL at 07:03

## 2017-03-08 NOTE — PLAN OF CARE
Problem: Patient Care Overview  Goal: Plan of Care Review  Pt free of trauma, falls, and injury. Pt VSS and afebrile throughout shift. Pt free of skin breakdown. Pt denies pain meds during shift. Pt has been eating and voiding adequately throughout shift. Purposeful rounding done. Pt has call light in reach,bed brakes on, side rails up x2, bed in low position, and nonskid socks on. Pt lying in bed in no distress. Will continue monitor.

## 2017-03-08 NOTE — NURSING
VSS; pt free of falls and injury; pt voided, ambulated and tolerated small meals. Needs assessed hourly. Pt denied pain during shift. Po fluids encouraged. Pt tolerated oral and IV antibiotics. Pt independent with all adls. Pt given verbal and written discharge instructions. Pt verbalized understanding to all discharge instructions including the need to attend follow up appointment.

## 2017-03-08 NOTE — DISCHARGE SUMMARY
[facility]  Discharge Summary      Admit Date: 3/5/2017    Discharge Date and Time:  03/08/2017 11:41 AM    Attending Physician: Bird Pond MD     Reason for Admission: UTI pyelonephritis    Procedures Performed: * No surgery found *    Hospital Course:  Ms. Lisa Pike is a 21 y.o. with a past medical history of celiac disease, prior pyelonephritis/UTI, here with abdominal pain. She states that yesterday she began having sharp , initially intermittent, then constant pain on the right side of her abdomen. She states that the pain became worse overnight, and then became constant. She states that this morning, she went to the store and on her arrival home , felt febrile, took her temperature which resulted as 100.7. She states she felt chills , but no rigors. She complains of nausea, but no emesis. She states that she initially presented to an urgent care today, but was referred to the ER.      Presently she states that her pain is manageable and her nausea has subsided significantly.       ED course : Tylenol 1000mg / Ancef 1gram / Cipro 400mg / Dialudid 0.5mg x 2 / Ibuprofen 800mg / Morphine 2mg x 2 / Zofran 4mg x 2 / 1000 cc 0.9% NaCL   _____________________________________    UTI pyelonephritis  - Empiric Rocephin  - Urine Culture E. Coli pan sensitive.   - Blood culture remained no growth.   - Antibiotic day 4 of 14                   Patient is doing well, tolerating PO intake, and will be discharged to follow up at [facility].        Consults: none    Significant Diagnostic Studies:   CT ABD PELVIS WITH 3/5/17:  No CT evidence of acute appendicitis.  Mild loss of the corticomedullary differentiation involving the right kidney with associated right-sided uroepithelial thickening.  The findings are most suggestive of pyelonephritis.  Suggest clinical correlation.  Small right corpus luteum cyst.  Report has been flagged in the EPIC medical record system.       PELVIC US 3/5/17:  IMPRESSION:  No definitive corpus luteum cyst as suggested on the previous CT scan of the abdomen and pelvis.  Small amount of fluid within the pelvis, otherwise unremarkable pelvic ultrasound.       Final Diagnoses:    Principal Problem: Sepsis secondary to UTI   Secondary Diagnoses:   Active Hospital Problems    Diagnosis  POA    Fever [R50.9]  Yes    Acute pyelonephritis [N10]  Yes    Acute cystitis with hematuria [N30.01]  Yes      Resolved Hospital Problems    Diagnosis Date Resolved POA    *Sepsis secondary to UTI [A41.9, N39.0] 03/08/2017 Yes       Discharged Condition: good    Disposition: Home or Self Care    Follow Up/Patient Instructions: PCP    Medications:  Reconciled Home Medications:   Current Discharge Medication List      START taking these medications    Details   ciprofloxacin HCl (CIPRO) 500 MG tablet Take 1 tablet (500 mg total) by mouth every 12 (twelve) hours.  Qty: 20 tablet, Refills: 0    Associated Diagnoses: Acute cystitis with hematuria; Acute pyelonephritis         CONTINUE these medications which have NOT CHANGED    Details   norgestimate-ethinyl estradiol (ORTHO TRI-CYCLEN,TRI-SPRINTEC) 0.18/0.215/0.25 mg-35 mcg (28) tablet Take 1 tablet by mouth once daily.             Discharge Procedure Orders  Diet general     Call MD for:  increased confusion or weakness     Call MD for:  persistent dizziness, light-headedness, or visual disturbances     Call MD for:  worsening rash     Call MD for:  severe persistent headache     Call MD for:  difficulty breathing or increased cough     Call MD for:  severe uncontrolled pain     Call MD for:  persistent nausea and vomiting or diarrhea     Call MD for:  temperature >100.4       Follow-up Information     Follow up with [facility]. Schedule an appointment as soon as possible for a visit in 3 days.    Contact information:    [place] [address] [phone]

## 2017-03-08 NOTE — PLAN OF CARE
Patient has been hospitalized from 3/5/17 through 3/8/17.  She is free to return to work now.                             _________________________________________    Bird Pond M.D.

## 2017-03-08 NOTE — PHYSICIAN QUERY
"PT Name: Lisa Pike  MR #: [id number]  Physician Query Form - Cause and Effect Relationship Clarification    Reviewer  Cherise Casiano RN, CCDS  ext [phone])     This form is a permanent document in the medical record.     Query Date: March 8, 2017  By submitting this query, we are merely seeking further clarification of documentation. Please utilize your independent clinical judgment when addressing the question(s) below.      Supporting Clinical Findings     Location in record               "Sepsis secondary to UTI"              "UTI pyelonephritis  - Empiric Rocephin  - Culture E. Coli pending         Urine culture postive  ESCHERICHIA COLI  >100,000 cfu/ml"                                                                                                                                       ceFEPIme in dextrose 5% 2 gram/50 mL IVPB 2 g  ceFAZolin (ANCEF) 1 gram in dextrose 5 % 50 mL IVPB  ciprofloxacin (CIPRO)400mg/200ml D5W IVPB 400 mg                     Progress note 3/7/17    Progress note 3/7/17        Lab 3/5/17-resulted 3/8/17        MAR 3/6/17-3/8/17  MAR 3/5/17    MAR 3/5/17                                                                                Provider, please clarify if there is any correlation between ___Sepsis secondary to UTI______ and ___ESCHERICHIA COLI__.     Are the conditions:        [  ] Due to or associated with each other      [  ] Unrelated to each other      [  ] Other (Please Specify): _________________________      [ X ] Clinically Undetermined                                                                                                                                                                                              "

## 2017-03-08 NOTE — PROGRESS NOTES
Progress Note  Hospital Medicine    Admit Date: 3/5/2017   LOS: 2 days     SUBJECTIVE:     Follow-up For:  Sepsis secondary to UTI    Interval History:    Still some fever.   Denies flank pain, nausea resolved. Tolerating PO.        Review of Systems:  Constitutional: No fever or chills  Respiratory: No cough or shorness of breath  Cardiovascular: No chest pain or palpitations  Gastrointestinal: No nausea or vomiting  Neurological: No confusion or weakness    OBJECTIVE:     Vital Signs Range (Last 24H):  Vitals:    03/08/17 0710   BP: 117/63   Pulse: 69   Resp: 18   Temp: 100.3 °F (37.9 °C)       Temp:  [98.2 °F (36.8 °C)-101.9 °F (38.8 °C)]   Pulse:  [68-82]   Resp:  [18-22]   BP: (100-117)/(55-65)   SpO2:  [97 %-99 %]     I & O (Last 24H):    Intake/Output Summary (Last 24 hours) at 03/08/17 0958  Last data filed at 03/08/17 0600   Gross per 24 hour   Intake              980 ml   Output                0 ml   Net              980 ml       Physical Exam:  General appearance: Calm, NAD  Eyes:  Conjunctivae/corneas clear. PERRL.  Lungs: Normal respiratory effort,   clear to auscultation bilaterally   Heart: Regular rate and rhythm, S1, S2 normal,  Abdomen: Soft, non-tender non-distended; bowel sounds normal;   Extremities: No cyanosis or clubbing. No edema.    Skin: Skin color, texture, turgor normal. No rashes or lesions  Neurologic:  No focal numbness or weakness     Laboratory Data:  Reviewed and noted  where applicable- Please see chart for full lab data.    Medications:  Medication list was reviewed and changes noted under Assessment/Plan.    Diagnostic Results:  CT ABD PELVIS WITH 3/5/17:  No CT evidence of acute appendicitis.  Mild loss of the corticomedullary differentiation involving the right kidney with associated right-sided uroepithelial thickening.  The findings are most suggestive of pyelonephritis.  Suggest clinical correlation.  Small right corpus luteum cyst.  Report has been flagged in the EPIC  medical record system.      PELVIC US 3/5/17:  IMPRESSION:  No definitive corpus luteum cyst as suggested on the previous CT scan of the abdomen and pelvis.  Small amount of fluid within the pelvis, otherwise unremarkable pelvic ultrasound.    ASSESSMENT/PLAN:     Active Hospital Problems    Diagnosis  POA    *Sepsis secondary to UTI [A41.9, N39.0]  Yes    Fever [R50.9]  Yes    Acute pyelonephritis [N10]  Yes    Acute cystitis with hematuria [N30.01]  Yes      Resolved Hospital Problems    Diagnosis Date Resolved POA   No resolved problems to display.       UTI pyelonephritis  - Empiric Rocephin  - Culture E. Coli pan sensitive.    - Antibiotic day 4 of 14    Lovenox

## 2017-03-11 LAB
BACTERIA BLD CULT: NORMAL
BACTERIA BLD CULT: NORMAL

## 2017-03-12 LAB
BACTERIA BLD CULT: NORMAL
BACTERIA BLD CULT: NORMAL

## 2017-04-30 ENCOUNTER — HOSPITAL ENCOUNTER (EMERGENCY)
Facility: OTHER | Age: 22
Discharge: HOME | End: 2017-04-30
Attending: EMERGENCY MEDICINE
Payer: COMMERCIAL

## 2017-04-30 VITALS
RESPIRATION RATE: 16 BRPM | OXYGEN SATURATION: 97 % | HEIGHT: 70 IN | SYSTOLIC BLOOD PRESSURE: 102 MMHG | TEMPERATURE: 98 F | HEART RATE: 81 BPM | DIASTOLIC BLOOD PRESSURE: 64 MMHG | BODY MASS INDEX: 21.05 KG/M2 | WEIGHT: 147 LBS

## 2017-04-30 DIAGNOSIS — M54.50 ACUTE BILATERAL LOW BACK PAIN WITHOUT SCIATICA: Primary | ICD-10-CM

## 2017-04-30 LAB
B-HCG UR QL: NEGATIVE
BILIRUB UR QL STRIP: NEGATIVE
CLARITY UR: CLEAR
COLOR UR: YELLOW
CTP QC/QA: YES
GLUCOSE UR QL STRIP: NEGATIVE
HGB UR QL STRIP: NEGATIVE
KETONES UR QL STRIP: NEGATIVE
LEUKOCYTE ESTERASE UR QL STRIP: NEGATIVE
NITRITE UR QL STRIP: NEGATIVE
PH UR STRIP: 6 [PH] (ref 5–8)
PROT UR QL STRIP: NEGATIVE
SP GR UR STRIP: <=1.005 (ref 1–1.03)
URN SPEC COLLECT METH UR: ABNORMAL
UROBILINOGEN UR STRIP-ACNC: NEGATIVE EU/DL

## 2017-04-30 PROCEDURE — 87086 URINE CULTURE/COLONY COUNT: CPT

## 2017-04-30 PROCEDURE — 99283 EMERGENCY DEPT VISIT LOW MDM: CPT | Mod: 25

## 2017-04-30 PROCEDURE — 81003 URINALYSIS AUTO W/O SCOPE: CPT

## 2017-04-30 PROCEDURE — 81025 URINE PREGNANCY TEST: CPT | Performed by: EMERGENCY MEDICINE

## 2017-04-30 NOTE — ED AVS SNAPSHOT
[facility]  [address]  [place] [address]               Lisa Stephensreck   2017  2:39 PM   ED    Description:  Female : 1995   Department:  [facility]           Your Care was Coordinated By:     Provider Role From To    Nam Hanley II, MD Attending Provider 17 [id number] --    Ida Sandoval PA-C Physician Assistant 17 [id number] --      Reason for Visit     Urinary Tract Infection           Diagnoses this Visit        Comments    Acute bilateral low back pain without sciatica    -  Primary       ED Disposition     None           To Do List           Follow-up Information     Follow up with [facility] In 2 days.    Contact information:    [place] [address] [phone]        [facility]     [facility] Nurse Care Line -  Assistance  Unless otherwise directed by your provider, please contact [facility], our nurse care line that is available for  assistance.     Registered nurses in the [facility] provide: appointment scheduling, clinical advisement, health education, and other advisory services.  Call: [phone] (toll free)               Medications           Message regarding Medications     Verify the changes and/or additions to your medication regime listed below are the same as discussed with your clinician today.  If any of these changes or additions are incorrect, please notify your healthcare provider.             Verify that the below list of medications is an accurate representation of the medications you are currently taking.  If none reported, the list may be blank. If incorrect, please contact your healthcare provider. Carry this list with you in case of emergency.           Current Medications     norgestimate-ethinyl estradiol (ORTHO TRI-CYCLEN,TRI-SPRINTEC) 0.18/0.215/0.25 mg-35 mcg (28) tablet Take 1 tablet by mouth once daily.     "       Clinical Reference Information           Your Vitals Were     BP Pulse Temp Resp Height Weight    111/75 (BP Location: Left arm, Patient Position: Sitting) 83 98 °F (36.7 °C) (Oral) 20 5' 10" (1.778 m) 66.7 kg (147 lb)    Last Period SpO2 BMI          04/16/2017 98% 21.09 kg/m2        Allergies as of 4/30/2017     No Known Allergies      Immunizations Administered on Date of Encounter - 4/30/2017     None      ED Micro, Lab, POCT     Start Ordered       Status Ordering Provider    04/30/17 1443 04/30/17 1442  Urine culture  STAT      In process     04/30/17 1441 04/30/17 1440  POCT urine pregnancy  Once      Final result     04/30/17 1441 04/30/17 1440  Urinalysis Clean Catch  STAT      Final result       ED Imaging Orders     None      Discharge References/Attachments     BACK PAIN (LOW): SELF-CARE (ENGLISH)    VIRAL SYNDROME (ADULT) (ENGLISH)       [facility] complies with applicable Federal civil rights laws and does not discriminate on the basis of race, color, national origin, age, disability, or sex.        Language Assistance Services     ATTENTION: Language assistance services are available, free of charge. Please call [phone].      ATENCIÓN: Si habla terrenceañol, tiene a fragoso disposición servicios gratuitos de asistencia lingüística. Llame al [phone].     CHÚ Ý: N?u b?n nói Ti?ng Vi?t, có các d?ch v? h? tr? ngôn ng? mi?n phí dành cho b?n. G?i s? [phone].        "

## 2017-04-30 NOTE — ED NOTES
Pt to ED c/o Urinary Tract Infection. Pt seen at [facility] and dx with a UTI - states put on Cipro and now feeling worse not better - pt has been running a fever at home, low back and no appeitie/N/V - was hospitalized in March for pylelonephritis and concerned may be the same. Pt reports lack of appetite, nausea, and weakness currently and reports vomiting yesterday. Pt denies burning on urination, CVA tenderness, frequency, foul odor in urine. Pt reports never having UTI like symptoms. Pt reports fever Friday night but is afebrile currently.

## 2017-04-30 NOTE — ED PROVIDER NOTES
Encounter Date: 4/30/2017       History     Chief Complaint   Patient presents with    Urinary Tract Infection     Pt seen at [facility] and dx with a UTI - states put on Cipro and now feeling worse not better - pt has been running a fever at home, low back and no appeitie/N/V - was hospitalized in March for pylelonephritis and concerned may be the same     Review of patient's allergies indicates:  No Known Allergies  HPI Comments: 21-year-old female with history of pyelonephritis and celiac disease presents emergency department with complaints of concerns for worsening symptoms from UTI.  She states on Friday she had an episode of lightheadedness and was seen at [facility].  She states that she was told she had a urinary tract infection and started on Cipro.  She states that she is feeling worse, reporting fever at home Friday night.  She reports one episode of vomiting with decreased appetite.  She complains of pain to the lower back.  She denies any dysuria, hematuria, urgency or frequency.  She complains of overall pain that is a 5 out of 10.  She admits that she was admitted in March for similar symptoms    The history is provided by the patient.     Past Medical History:   Diagnosis Date    Celiac disease     Pyelonephritis      Past Surgical History:   Procedure Laterality Date    LEG SURGERY       History reviewed. No pertinent family history.  Social History   Substance Use Topics    Smoking status: Never Smoker    Smokeless tobacco: None    Alcohol use Yes      Comment: social     Review of Systems   Constitutional: Positive for fever. Negative for chills.   HENT: Negative for sore throat.    Respiratory: Negative for shortness of breath.    Cardiovascular: Negative for chest pain.   Gastrointestinal: Positive for nausea and vomiting. Negative for abdominal pain.   Genitourinary: Negative for difficulty urinating, dysuria, frequency, hematuria, urgency, vaginal bleeding and vaginal  discharge.   Musculoskeletal: Positive for back pain.   Skin: Negative for rash.   Neurological: Positive for dizziness. Negative for weakness.   Hematological: Does not bruise/bleed easily.       Physical Exam   Initial Vitals   BP Pulse Resp Temp SpO2   04/30/17 1426 04/30/17 1426 04/30/17 1426 04/30/17 1426 04/30/17 1426   111/75 83 20 98 °F (36.7 °C) 98 %     Physical Exam    Nursing note and vitals reviewed.  Constitutional: Vital signs are normal. She appears well-developed and well-nourished.  Non-toxic appearance. No distress.   HENT:   Head: Normocephalic and atraumatic.   Right Ear: External ear normal.   Left Ear: External ear normal.   Nose: Nose normal.   Mouth/Throat: Oropharynx is clear and moist.   Eyes: Conjunctivae, EOM and lids are normal. Pupils are equal, round, and reactive to light. No scleral icterus.   Neck: Normal range of motion and phonation normal. Neck supple.   Cardiovascular: Normal rate, regular rhythm and normal heart sounds. Exam reveals no gallop and no friction rub.    No murmur heard.  Pulmonary/Chest: Effort normal and breath sounds normal. No respiratory distress. She has no decreased breath sounds. She has no wheezes. She has no rhonchi. She has no rales.   Abdominal: Soft. Normal appearance and bowel sounds are normal. There is no tenderness. There is no rigidity, no rebound, no guarding, no CVA tenderness, no tenderness at McBurney's point and negative Yun's sign.   Musculoskeletal: Normal range of motion.   No obvious deformities, moving all extremities, normal gait  No midline ttp or step offs of the cervical, thoracic or lumbar spine.   Neurological: She is alert and oriented to person, place, and time. She has normal strength and normal reflexes. No sensory deficit.   Skin: Skin is warm, dry and intact. No lesion and no rash noted. No erythema.   Psychiatric: She has a normal mood and affect. Her speech is normal and behavior is normal. Judgment normal. Cognition and  memory are normal.         ED Course   Procedures  Labs Reviewed   URINALYSIS - Abnormal; Notable for the following:        Result Value    Specific Gravity, UA <=1.005 (*)     All other components within normal limits   POCT URINE PREGNANCY - Normal   CULTURE, URINE             Medical Decision Making:   History:   Old Medical Records: I decided to obtain old medical records.  Old Records Summarized: other records.       <> Summary of Records: Admitted for pyelonephritis at the beginning of March which was seen on CT.  At that time urinalysis did show signs of infection  Initial Assessment:   21-year-old female with complaints consistent with low back pain.  Vital signs stable, afebrile, neurovascularly intact.  She is alert, healthy and nontoxic appearing.  She is in no apparent distress.  Abdomen is soft and nontender.  No CVA tenderness palpation.  There is no evidence spinal cord compression or cauda equina syndrome.  Clinical Tests:   Lab Tests: Ordered and Reviewed  The following lab test(s) were unremarkable: UPT and Urinalysis  ED Management:  UPT negative.  Urinalysis shows no evidence of serious bacterial infection, UTI pyelonephritis.  Nitrite negative.  No blood.  No leukocytes.  Urine culture pending.  Due to previous history advised patient to stay on Cipro that she was prescribed.  Will call for any growth on culture.  Discussed with her that her symptoms may be secondary to a viral syndrome.  She is urged to follow-up with her doctor or return for any worsening symptoms.  She states understanding.  This patient was discussed with the attending physician who agrees with treatment plan.  Other:   I have discussed this case with another health care provider.       <> Summary of the Discussion: Dayan  This note was created using Dragon Medical dictation.  There may be typographical errors secondary to dictation.                     ED Course     Clinical Impression:     1. Acute bilateral low back pain  without sciatica        Disposition:   Disposition: Discharged  Condition: Stable       Ida Sandoval PA-C  04/30/17 1545

## 2017-05-02 LAB — BACTERIA UR CULT: NO GROWTH

## 2018-04-04 ENCOUNTER — HOSPITAL ENCOUNTER (EMERGENCY)
Facility: OTHER | Age: 23
Discharge: HOME | End: 2018-04-04
Attending: EMERGENCY MEDICINE
Payer: COMMERCIAL

## 2018-04-04 VITALS
SYSTOLIC BLOOD PRESSURE: 108 MMHG | BODY MASS INDEX: 22.19 KG/M2 | OXYGEN SATURATION: 97 % | DIASTOLIC BLOOD PRESSURE: 57 MMHG | HEIGHT: 70 IN | RESPIRATION RATE: 18 BRPM | WEIGHT: 155 LBS | HEART RATE: 64 BPM | TEMPERATURE: 99 F

## 2018-04-04 DIAGNOSIS — R10.31 RIGHT LOWER QUADRANT ABDOMINAL PAIN: ICD-10-CM

## 2018-04-04 DIAGNOSIS — N39.0 URINARY TRACT INFECTION WITHOUT HEMATURIA, SITE UNSPECIFIED: Primary | ICD-10-CM

## 2018-04-04 LAB
ALBUMIN SERPL BCP-MCNC: 4.2 G/DL
ALP SERPL-CCNC: 60 U/L
ALT SERPL W/O P-5'-P-CCNC: 30 U/L
ANION GAP SERPL CALC-SCNC: 8 MMOL/L
AST SERPL-CCNC: 28 U/L
B-HCG UR QL: NEGATIVE
BACTERIA #/AREA URNS HPF: ABNORMAL /HPF
BASOPHILS # BLD AUTO: 0.05 K/UL
BASOPHILS NFR BLD: 0.7 %
BILIRUB SERPL-MCNC: 0.7 MG/DL
BILIRUB UR QL STRIP: NEGATIVE
BUN SERPL-MCNC: 14 MG/DL
CALCIUM SERPL-MCNC: 9.3 MG/DL
CHLORIDE SERPL-SCNC: 104 MMOL/L
CLARITY UR: CLEAR
CO2 SERPL-SCNC: 25 MMOL/L
COLOR UR: YELLOW
CREAT SERPL-MCNC: 0.9 MG/DL
CTP QC/QA: YES
DIFFERENTIAL METHOD: NORMAL
EOSINOPHIL # BLD AUTO: 0.1 K/UL
EOSINOPHIL NFR BLD: 1.2 %
ERYTHROCYTE [DISTWIDTH] IN BLOOD BY AUTOMATED COUNT: 13.6 %
EST. GFR  (AFRICAN AMERICAN): >60 ML/MIN/1.73 M^2
EST. GFR  (NON AFRICAN AMERICAN): >60 ML/MIN/1.73 M^2
GLUCOSE SERPL-MCNC: 84 MG/DL
GLUCOSE UR QL STRIP: NEGATIVE
HCT VFR BLD AUTO: 40.5 %
HGB BLD-MCNC: 13.5 G/DL
HGB UR QL STRIP: ABNORMAL
KETONES UR QL STRIP: NEGATIVE
LEUKOCYTE ESTERASE UR QL STRIP: ABNORMAL
LYMPHOCYTES # BLD AUTO: 2.9 K/UL
LYMPHOCYTES NFR BLD: 37.7 %
MCH RBC QN AUTO: 29.6 PG
MCHC RBC AUTO-ENTMCNC: 33.3 G/DL
MCV RBC AUTO: 89 FL
MICROSCOPIC COMMENT: ABNORMAL
MONOCYTES # BLD AUTO: 0.5 K/UL
MONOCYTES NFR BLD: 6.4 %
NEUTROPHILS # BLD AUTO: 4.1 K/UL
NEUTROPHILS NFR BLD: 53.5 %
NITRITE UR QL STRIP: NEGATIVE
PH UR STRIP: 6 [PH] (ref 5–8)
PLATELET # BLD AUTO: 248 K/UL
PMV BLD AUTO: 10.2 FL
POTASSIUM SERPL-SCNC: 3.8 MMOL/L
PROT SERPL-MCNC: 7.2 G/DL
PROT UR QL STRIP: NEGATIVE
RBC # BLD AUTO: 4.56 M/UL
RBC #/AREA URNS HPF: 2 /HPF (ref 0–4)
SODIUM SERPL-SCNC: 137 MMOL/L
SP GR UR STRIP: 1.02 (ref 1–1.03)
SQUAMOUS #/AREA URNS HPF: 6 /HPF
URN SPEC COLLECT METH UR: ABNORMAL
UROBILINOGEN UR STRIP-ACNC: NEGATIVE EU/DL
WBC # BLD AUTO: 7.62 K/UL
WBC #/AREA URNS HPF: 8 /HPF (ref 0–5)

## 2018-04-04 PROCEDURE — 96376 TX/PRO/DX INJ SAME DRUG ADON: CPT

## 2018-04-04 PROCEDURE — 96375 TX/PRO/DX INJ NEW DRUG ADDON: CPT

## 2018-04-04 PROCEDURE — 96361 HYDRATE IV INFUSION ADD-ON: CPT

## 2018-04-04 PROCEDURE — 96374 THER/PROPH/DIAG INJ IV PUSH: CPT

## 2018-04-04 PROCEDURE — 80053 COMPREHEN METABOLIC PANEL: CPT

## 2018-04-04 PROCEDURE — 85025 COMPLETE CBC W/AUTO DIFF WBC: CPT

## 2018-04-04 PROCEDURE — 63600175 PHARM REV CODE 636 W HCPCS: Performed by: EMERGENCY MEDICINE

## 2018-04-04 PROCEDURE — 99284 EMERGENCY DEPT VISIT MOD MDM: CPT | Mod: 25

## 2018-04-04 PROCEDURE — 81000 URINALYSIS NONAUTO W/SCOPE: CPT

## 2018-04-04 PROCEDURE — 87086 URINE CULTURE/COLONY COUNT: CPT

## 2018-04-04 PROCEDURE — 25000003 PHARM REV CODE 250: Performed by: PHYSICIAN ASSISTANT

## 2018-04-04 PROCEDURE — 63600175 PHARM REV CODE 636 W HCPCS: Performed by: PHYSICIAN ASSISTANT

## 2018-04-04 PROCEDURE — 81025 URINE PREGNANCY TEST: CPT | Performed by: EMERGENCY MEDICINE

## 2018-04-04 PROCEDURE — 25000003 PHARM REV CODE 250: Performed by: EMERGENCY MEDICINE

## 2018-04-04 PROCEDURE — 25500020 PHARM REV CODE 255: Performed by: EMERGENCY MEDICINE

## 2018-04-04 RX ORDER — ACETAMINOPHEN 500 MG
1000 TABLET ORAL
Status: COMPLETED | OUTPATIENT
Start: 2018-04-04 | End: 2018-04-04

## 2018-04-04 RX ORDER — ONDANSETRON 2 MG/ML
4 INJECTION INTRAMUSCULAR; INTRAVENOUS
Status: COMPLETED | OUTPATIENT
Start: 2018-04-04 | End: 2018-04-04

## 2018-04-04 RX ORDER — CEFTRIAXONE 1 G/1
1 INJECTION, POWDER, FOR SOLUTION INTRAMUSCULAR; INTRAVENOUS
Status: COMPLETED | OUTPATIENT
Start: 2018-04-04 | End: 2018-04-04

## 2018-04-04 RX ORDER — ONDANSETRON 4 MG/1
4 TABLET, ORALLY DISINTEGRATING ORAL EVERY 8 HOURS PRN
Qty: 12 TABLET | Refills: 0 | Status: SHIPPED | OUTPATIENT
Start: 2018-04-04 | End: 2018-04-25

## 2018-04-04 RX ORDER — CEPHALEXIN 500 MG/1
500 CAPSULE ORAL EVERY 12 HOURS
Qty: 14 CAPSULE | Refills: 0 | Status: SHIPPED | OUTPATIENT
Start: 2018-04-04 | End: 2018-04-14

## 2018-04-04 RX ORDER — KETOROLAC TROMETHAMINE 30 MG/ML
10 INJECTION, SOLUTION INTRAMUSCULAR; INTRAVENOUS
Status: COMPLETED | OUTPATIENT
Start: 2018-04-04 | End: 2018-04-04

## 2018-04-04 RX ADMIN — SODIUM CHLORIDE 1000 ML: 0.9 INJECTION, SOLUTION INTRAVENOUS at 07:04

## 2018-04-04 RX ADMIN — IOHEXOL 75 ML: 350 INJECTION, SOLUTION INTRAVENOUS at 06:04

## 2018-04-04 RX ADMIN — SODIUM CHLORIDE 1000 ML: 0.9 INJECTION, SOLUTION INTRAVENOUS at 05:04

## 2018-04-04 RX ADMIN — ACETAMINOPHEN 1000 MG: 500 TABLET ORAL at 07:04

## 2018-04-04 RX ADMIN — KETOROLAC TROMETHAMINE 10 MG: 30 INJECTION, SOLUTION INTRAMUSCULAR; INTRAVENOUS at 05:04

## 2018-04-04 RX ADMIN — ONDANSETRON HYDROCHLORIDE 4 MG: 2 INJECTION, SOLUTION INTRAMUSCULAR; INTRAVENOUS at 08:04

## 2018-04-04 RX ADMIN — CEFTRIAXONE SODIUM 1 G: 1 INJECTION, POWDER, FOR SOLUTION INTRAMUSCULAR; INTRAVENOUS at 08:04

## 2018-04-04 RX ADMIN — ONDANSETRON HYDROCHLORIDE 4 MG: 2 INJECTION, SOLUTION INTRAMUSCULAR; INTRAVENOUS at 06:04

## 2018-04-04 NOTE — ED NOTES
Pt to ED with RLQ abdominal x 4 days with diarrhea and nausea yesterday. She reports she had one episode of vomiting over the last couple days. Abdomen is non distended. Pt AAOx4 and appropriate at this time. Respirations even and unlabored. No acute distress noted.

## 2018-04-04 NOTE — ED PROVIDER NOTES
Encounter Date: 4/4/2018       History     Chief Complaint   Patient presents with    Abdominal Pain     +right upper and lower abdominal pains w/ new onset Saturday, sent from [facility] for further evaluation. + intermittent N/V, denies fever or chills     Patient is a 22 y.o. female presenting to the emergency department with complaints of right lower quadrant abdominal pain.  The patient reports her symptoms started on 3/31/18.  She states that initially at onset, she had associated fever and chills.  She also reports that she had nausea, vomiting, diarrhea.  She states that most of the vomiting has subsided but she still has decreased appetite and nausea.  She states she hasn't had a fever since 2 days ago.  She reports that the pain is constant, and at times it becomes sharp and stabbing.  She denies any radiation of the pain.  She denies dysuria, hematuria, vaginal discharge.  She states she was seen in urgent care earlier today where they tested her urine and told her was normal.  They recommended she come immediately to the emergency room for evaluation.  She denies taking any medication for symptoms thus far.      The history is provided by the patient.     Review of patient's allergies indicates:  No Known Allergies  Past Medical History:   Diagnosis Date    Celiac disease     Pyelonephritis      Past Surgical History:   Procedure Laterality Date    LEG SURGERY       History reviewed. No pertinent family history.  Social History   Substance Use Topics    Smoking status: Never Smoker    Smokeless tobacco: Never Used    Alcohol use Yes      Comment: social     Review of Systems   Constitutional: Positive for fever. Negative for activity change, appetite change, chills and fatigue.   HENT: Negative for congestion, rhinorrhea and sore throat.    Eyes: Negative for photophobia and visual disturbance.   Respiratory: Negative for cough, shortness of breath and wheezing.    Cardiovascular: Negative for  chest pain.   Gastrointestinal: Positive for abdominal pain, diarrhea, nausea and vomiting.   Genitourinary: Negative for dysuria, hematuria and urgency.   Musculoskeletal: Negative for back pain, myalgias and neck pain.   Skin: Negative for color change and wound.   Neurological: Negative for weakness and headaches.   Psychiatric/Behavioral: Negative for agitation and confusion.       Physical Exam     Initial Vitals [04/04/18 1609]   BP Pulse Resp Temp SpO2   137/87 75 16 98.1 °F (36.7 °C) 100 %      MAP       103.67         Physical Exam    Nursing note and vitals reviewed.  Constitutional: Vital signs are normal. She appears well-developed and well-nourished. She is not diaphoretic. She is cooperative.  Non-toxic appearance. She does not have a sickly appearance. She does not appear ill. No distress.   Well appearing  female accompanied by another female me emergency department.  She speaking clear and full sentences.  She is in no acute distress.  She ambulates that difficulty.   HENT:   Head: Normocephalic and atraumatic.   Right Ear: External ear normal.   Left Ear: External ear normal.   Nose: Nose normal.   Mouth/Throat: Oropharynx is clear and moist.   Eyes: Conjunctivae and EOM are normal.   Neck: Normal range of motion. Neck supple.   Cardiovascular: Normal rate, regular rhythm and normal heart sounds.   Pulmonary/Chest: Breath sounds normal. No respiratory distress. She has no wheezes.   Abdominal: Soft. Bowel sounds are normal. She exhibits no distension. There is tenderness in the right lower quadrant. There is no rebound and no guarding.   Tenderness to palpation of the right lower quadrant no rebound, guarding or mass.   Musculoskeletal: Normal range of motion.   Neurological: She is alert and oriented to person, place, and time.   Skin: Skin is warm.   Psychiatric: She has a normal mood and affect. Her behavior is normal. Judgment and thought content normal.         ED Course    Procedures  Labs Reviewed   URINALYSIS - Abnormal; Notable for the following:        Result Value    Occult Blood UA 1+ (*)     Leukocytes, UA Trace (*)     All other components within normal limits   URINALYSIS MICROSCOPIC - Abnormal; Notable for the following:     WBC, UA 8 (*)     Bacteria, UA Few (*)     All other components within normal limits   CULTURE, URINE   CBC W/ AUTO DIFFERENTIAL   COMPREHENSIVE METABOLIC PANEL   POCT URINE PREGNANCY        Imaging Results          CT Abdomen Pelvis With Contrast (Final result)  Result time 04/04/18 18:40:26    Final result by Shine Blair MD (04/04/18 18:40:26)                 Impression:      1. Mild indistinctness of the right renal hilum with questionable urothelial enhancement of the right ureter, clinical correlation with urinalysis recommended, pyelonephritis is not excluded.  Please note, the appearance is similar to the previous examination, and could reflect chronic finding.  2. Findings concerning for constipation, correlation recommended.  3. Mild periportal edema with minimal intrahepatic biliary dilation, correlation with volume status and LFTs recommended.  4. Heterogeneous enhancement of the uterus, correlation with phase of menstrual cycle advised.  5. Several additional findings above.      Electronically signed by: Shine Blair MD  Date:    04/04/2018  Time:    18:40             Narrative:    EXAMINATION:  CT ABDOMEN PELVIS WITH CONTRAST    CLINICAL HISTORY:  RLQ pain, appendicitis suspected;    TECHNIQUE:  Low dose axial images, sagittal and coronal reformations were obtained from the lung bases to the pubic symphysis following the IV administration of 75 mL of Omnipaque 350 .  Oral contrast was not given.    COMPARISON:  03/05/2017    FINDINGS:  Images of the lower thorax are unremarkable.    The liver, spleen, pancreas, gallbladder and adrenal glands are grossly unremarkable noting there is minimal periportal edema, clinical correlation  with volume status recommended.  There is no significant extrahepatic biliary dilation.  There is mild intrahepatic biliary dilation.  The portal vein, splenic vein, SMV, celiac axis and SMA all are patent.  No significant abdominal lymphadenopathy.    The kidneys enhance symmetrically without nephrolithiasis.  There is mild indistinctness about the right renal hilum noting minimal prominence of the right renal collecting system.  No definite right ureteral calculi noting there may be some urothelial enhancement of the right ureter.  The urinary bladder is grossly unremarkable.  The uterus heterogeneously enhances, correlation with phase of menstrual cycle recommended.  The left adnexa is grossly unremarkable.  There is a cyst, presumably within the right ovary, measuring approximately 2.2 cm.  There is a small amount of free fluid in the pelvis.  No convincing focal organized pelvic fluid collection.    There is a moderate to large amount of stool in the rectum noting moderate stool throughout the remainder of the colon, could reflect constipation.  The terminal ileum is unremarkable.  The appendix is unremarkable.  The small bowel is grossly unremarkable.    No focal osseous destructive process.    No significant inguinal lymphadenopathy.                                  Medical Decision Making:   Initial Assessment:   Urgent evaluation of a 22-year-old female presenting to the emergency department with complaints of right lower quadrant pain x4 days.  Patient is afebrile, nontoxic appearing, hemodynamically stable.  Physical exam reveals regular rate and rhythm, lungs are clear to auscultation bilaterally.  Tenderness to palpation of the right lower quadrant with no rebound, guarding, mass.  Given the patient's history and physical exam findings, signs and symptoms are concerning for possible appendicitis.  Will plan for labs and CT scan.  Clinical Tests:   Lab Tests: Ordered and Reviewed  The following lab  test(s) were unremarkable: CBC, CMP, Urinalysis and UPT  Radiological Study: Ordered and Reviewed  ED Management:  UPT is negative.  UA shows some bacteria.  CBC shows no leukocytosis, normal H&H.  CMP is unremarkable.  CT scan shows evidence of pyelonephritis, consistent with her previous presentation.  No evidence of appendicitis.  Patient was started on antibiotics in the ED, given Rocephin and will be discharged home with Keflex.  Also discharged home with a prescription for Zofran.  Urine culture sent.  She is counseled on symptomatic care and treatment.  Stable for discharge home. The patient was instructed to follow up with a primary care provider in 2 days or to return to the emergency department for worsening symptoms. The treatment plan was discussed with the patient who demonstrated understanding and comfort with plan. The patient's history, physical exam, and plan of care was discussed with and agreed upon with my supervising physician.    Other:   I have discussed this case with another health care provider.       <> Summary of the Discussion: Dr. Parikh  This note was created using Dragon Medical Dictation. There may be typographical errors secondary to dictation.               Attending Attestation:     Physician Attestation Statement for NP/PA:   I have conducted a face to face encounter with this patient in addition to the NP/PA, due to Medical Complexity    Other NP/PA Attestation Additions:      Medical Decision MakinF with R sided abdominal pain, N/V. Tenderness to RLQ on exam so CT abdomen done, shows possible early pyelonephritis, no sign appendicitis or cholecystitis. U/A with 8 WBC, labs otherwise with normal WBC and no other acute findings. She has had similar episodes in the past with pyelo when seen here. Tolerating PO with no co-morbidities and no fever, stable for outpatient management. Will tx with Ceftriaxone and discharge with Keflex pending urine cultures.                     Clinical Impression:     1. Urinary tract infection without hematuria, site unspecified    2. Right lower quadrant abdominal pain       Disposition:   Disposition: Discharged  Condition: Stable                        Jeanette Chapman PA-C  04/04/18 2041       Jimmie Parikh MD  04/05/18 1218

## 2018-04-04 NOTE — ED NOTES
ROUNDING:  Lying on the stretcher with HOB elevated. AAOx4. States RLQ abd pain 510. Pt states pain is tolerable. C/o nausea. Denies any other discomfort at this time. Skin is warm and dry. Resp:18even and unlabored. Comfort and BR needs addressed. Plan of care updated. NADN. Bed locked in low position, side rails up x2 and call light within reach. Will continue to monitor

## 2018-04-04 NOTE — ED NOTES
Rounding on the patient has been done. she has been updated on the plan of care and her current status. Pain was assessed and is currently a 6/10 RLQ abd pain. Comfort positioning and restroom needs were addressed. Necessary items were placed with in her reach and she was advised when a reassessment would take place. The call bell remains at the bedside for any additional patient needs. The patient is resting comfortably on the stretcher, respirations are even and unlabored, skin warm and dry. Pt's friend at BS. Will continue to monitor.

## 2018-04-04 NOTE — ED NOTES
Appearance: Pt awake, alert & oriented to person, place & time. Pt in no acute distress at present time. Pt is clean and well groomed with clothes appropriately fastened.   Skin: Skin warm, dry & intact. Color consistent with ethnicity. Mucous membranes moist. No breakdown or brusing noted.   Musculoskeletal: Patient moving all extremities well, no obvious swelling or deformities noted.   Respiratory: Respirations spontaneous, even, and non-labored. Visible chest rise noted. Airway is open and patent. No accessory muscle use noted.   Neurologic: Sensation is intact. Speech is clear and appropriate. Eyes open spontaneously, behavior appropriate to situation, follows commands, facial expression symmetrical, bilateral hand grasp equal and even, purposeful motor response noted.  Cardiac: All peripheral pulses present. No Bilateral lower extremity edema. Cap refill is <3 seconds. Pt denies active chest pains, SOB, dizziness, blurred vision, weakness or fatigue at this time.   Abdomen: Pt with pain to RLQ x 4 days. Last meal was last night. Pt with decreased appetite.   : Pt reports no dysuria or hematuria.

## 2018-04-05 NOTE — ED NOTES
ROUNDING:  Lying on the stretcher with HOB elevated. AAOx4. C/o 6-7/10 RLQ abd pain. Denies any other discomfort at this time. Skin is warm and dry. Resp:18 even and unlabored. Comfort and BR needs addressed. Plan of care updated. NADN. Bed locked in low position, side rails up x2 and call light within reach. Will continue to monitor

## 2018-04-06 LAB — BACTERIA UR CULT: NORMAL

## 2018-04-16 ENCOUNTER — HOSPITAL ENCOUNTER (EMERGENCY)
Facility: OTHER | Age: 23
Discharge: HOME | End: 2018-04-16
Attending: EMERGENCY MEDICINE
Payer: COMMERCIAL

## 2018-04-16 VITALS
DIASTOLIC BLOOD PRESSURE: 70 MMHG | HEIGHT: 70 IN | OXYGEN SATURATION: 100 % | BODY MASS INDEX: 22.9 KG/M2 | WEIGHT: 160 LBS | HEART RATE: 65 BPM | RESPIRATION RATE: 18 BRPM | SYSTOLIC BLOOD PRESSURE: 124 MMHG | TEMPERATURE: 98 F

## 2018-04-16 DIAGNOSIS — R31.9 URINARY TRACT INFECTION WITH HEMATURIA, SITE UNSPECIFIED: Primary | ICD-10-CM

## 2018-04-16 DIAGNOSIS — R10.9 FLANK PAIN: ICD-10-CM

## 2018-04-16 DIAGNOSIS — N39.0 URINARY TRACT INFECTION WITH HEMATURIA, SITE UNSPECIFIED: Primary | ICD-10-CM

## 2018-04-16 LAB
ANION GAP SERPL CALC-SCNC: 9 MMOL/L
B-HCG UR QL: NEGATIVE
BACTERIA #/AREA URNS HPF: ABNORMAL /HPF
BILIRUB UR QL STRIP: NEGATIVE
BUN SERPL-MCNC: 10 MG/DL
CALCIUM SERPL-MCNC: 9.7 MG/DL
CHLORIDE SERPL-SCNC: 107 MMOL/L
CLARITY UR: CLEAR
CO2 SERPL-SCNC: 23 MMOL/L
COLOR UR: YELLOW
CREAT SERPL-MCNC: 0.8 MG/DL
CTP QC/QA: YES
EST. GFR  (AFRICAN AMERICAN): >60 ML/MIN/1.73 M^2
EST. GFR  (NON AFRICAN AMERICAN): >60 ML/MIN/1.73 M^2
GLUCOSE SERPL-MCNC: 90 MG/DL
GLUCOSE UR QL STRIP: NEGATIVE
HGB UR QL STRIP: ABNORMAL
KETONES UR QL STRIP: NEGATIVE
LEUKOCYTE ESTERASE UR QL STRIP: ABNORMAL
MICROSCOPIC COMMENT: ABNORMAL
NITRITE UR QL STRIP: NEGATIVE
PH UR STRIP: 7 [PH] (ref 5–8)
POTASSIUM SERPL-SCNC: 4.2 MMOL/L
PROT UR QL STRIP: NEGATIVE
RBC #/AREA URNS HPF: 3 /HPF (ref 0–4)
SODIUM SERPL-SCNC: 139 MMOL/L
SP GR UR STRIP: <=1.005 (ref 1–1.03)
SQUAMOUS #/AREA URNS HPF: 6 /HPF
URN SPEC COLLECT METH UR: ABNORMAL
UROBILINOGEN UR STRIP-ACNC: NEGATIVE EU/DL
WBC #/AREA URNS HPF: 8 /HPF (ref 0–5)
WBC CLUMPS URNS QL MICRO: ABNORMAL
YEAST URNS QL MICRO: ABNORMAL

## 2018-04-16 PROCEDURE — 87086 URINE CULTURE/COLONY COUNT: CPT

## 2018-04-16 PROCEDURE — 81000 URINALYSIS NONAUTO W/SCOPE: CPT

## 2018-04-16 PROCEDURE — 80048 BASIC METABOLIC PNL TOTAL CA: CPT

## 2018-04-16 PROCEDURE — 99283 EMERGENCY DEPT VISIT LOW MDM: CPT

## 2018-04-16 PROCEDURE — 81025 URINE PREGNANCY TEST: CPT | Performed by: PHYSICIAN ASSISTANT

## 2018-04-16 RX ORDER — PHENAZOPYRIDINE HYDROCHLORIDE 200 MG/1
200 TABLET, FILM COATED ORAL 3 TIMES DAILY PRN
Qty: 6 TABLET | Refills: 0 | Status: SHIPPED | OUTPATIENT
Start: 2018-04-16 | End: 2018-04-18

## 2018-04-16 NOTE — ED PROVIDER NOTES
Encounter Date: 4/16/2018       History     Chief Complaint   Patient presents with    Urinary Frequency     Pt c/o urgency, frequency and dysuria. Pt reports that she is now on the second round of antibiotics in the last 1 1/2 weeks for a kidney infection and the s/s are worsening.      Patient is a 22-year-old female with no significant medical history who presents to the emergency department with dysuria.  Patient states over the last 3 weeks she has had dysuria and urinary frequency.  She reports right-sided flank pain.  She states 2 weeks ago she was seen in the emergency department and treated with Keflex.  She states she had a CT scan that showed no evidence of appendicitis.  She states she was told she had a kidney infection.  She states her symptoms resolved but then re-presented over the last couple of days.  She states she was seen again at urgent care yesterday and started on Cipro.  She states she woke up this morning with dysuria and right-sided flank pain.  Denies fevers or chills.  She denies nausea or vomiting.  She denies diarrhea.  She states she's been compliant with antibiotics.      The history is provided by the patient.     Review of patient's allergies indicates:  No Known Allergies  Past Medical History:   Diagnosis Date    Celiac disease     Pyelonephritis      Past Surgical History:   Procedure Laterality Date    LEG SURGERY       History reviewed. No pertinent family history.  Social History   Substance Use Topics    Smoking status: Never Smoker    Smokeless tobacco: Never Used    Alcohol use Yes      Comment: social     Review of Systems   Constitutional: Negative for activity change, appetite change, chills, fatigue and fever.   HENT: Negative for congestion, ear discharge, ear pain, nosebleeds, postnasal drip, sore throat and trouble swallowing.    Respiratory: Negative for cough and shortness of breath.    Cardiovascular: Negative for chest pain.   Gastrointestinal: Negative  for abdominal pain, blood in stool, constipation, diarrhea, nausea and vomiting.   Genitourinary: Positive for dysuria, flank pain and frequency. Negative for hematuria.   Musculoskeletal: Negative for back pain, neck pain and neck stiffness.   Skin: Negative for rash and wound.   Neurological: Negative for dizziness, syncope, speech difficulty, weakness, light-headedness, numbness and headaches.       Physical Exam     Initial Vitals [04/16/18 0948]   BP Pulse Resp Temp SpO2   125/76 74 18 97.2 °F (36.2 °C) 99 %      MAP       92.33         Physical Exam    Nursing note and vitals reviewed.  Constitutional: She appears well-developed and well-nourished. She is not diaphoretic. No distress.   HENT:   Head: Normocephalic.   Right Ear: External ear normal.   Left Ear: External ear normal.   Nose: Nose normal.   Mouth/Throat: Oropharynx is clear and moist. No oropharyngeal exudate.   Eyes: Conjunctivae are normal. Pupils are equal, round, and reactive to light.   Neck: Normal range of motion.   Cardiovascular: Normal rate and regular rhythm.   Pulmonary/Chest: Breath sounds normal.   Abdominal: Soft. Bowel sounds are normal. She exhibits no distension and no mass. There is no tenderness. There is no rebound, no guarding, no CVA tenderness, no tenderness at McBurney's point and negative Yun's sign.   Lymphadenopathy:     She has no cervical adenopathy.   Neurological: She is alert and oriented to person, place, and time.   Skin: Skin is warm and dry. Capillary refill takes less than 2 seconds.   Psychiatric: She has a normal mood and affect.         ED Course   Procedures  Labs Reviewed   URINALYSIS - Abnormal; Notable for the following:        Result Value    Specific Gravity, UA <=1.005 (*)     Occult Blood UA 1+ (*)     Leukocytes, UA Trace (*)     All other components within normal limits   URINALYSIS MICROSCOPIC - Abnormal; Notable for the following:     WBC, UA 8 (*)     All other components within normal  limits   BASIC METABOLIC PANEL   POCT URINE PREGNANCY             Medical Decision Making:   Initial Assessment:   Urgent evaluation of a 22-year-old female with no significant medical history who presents to the emergency department with dysuria.  Patient is afebrile, nontoxic appearing, and hemodynamically stable.  Benign abdominal exam.  Right-sided CVA tenderness.  I do not suspect acute abdomen.  Chart is reviewed.  Urinalysis does not appear different from urinalysis 2 weeks ago.  Urine culture had no growth 2 weeks ago.  Patient is currently on Cipro.  I feel it this is good treatment at this time.  We'll send a culture at this time.  We'll check kidney function.  I do not feel that imaging is warranted.  ED Management:  12:09 PM  Kidney function is normal.  Patient is advised to follow-up with urologist if symptoms persist.  Culture is pending.  Patient will be given peridium.  She is advised to continue Cipro until finished.  She is advised to return to the emergency department with any worsening symptoms or concerns.  Other:   I have discussed this case with another health care provider.       <> Summary of the Discussion: Dr. Boggs                      Clinical Impression:   The primary encounter diagnosis was Urinary tract infection with hematuria, site unspecified. A diagnosis of Flank pain was also pertinent to this visit.                           Grazyna Frazier PA-C  04/16/18 [id number]

## 2018-04-16 NOTE — ED TRIAGE NOTES
Pt reports being tx for early kidney infection with completion of antibiotics on 4/13. Pt reports she went to urgent care yesterday and was told the kidney infection was still there. Pt reports dysuria and frequency. Reports taking cipro last night and this morning as well as ibuprofen. Denies any fever. Reports nausea and R side flank pain that radiates towards her abd.

## 2018-04-17 LAB — BACTERIA UR CULT: NO GROWTH

## 2018-04-23 ENCOUNTER — HOSPITAL ENCOUNTER (EMERGENCY)
Facility: OTHER | Age: 23
Discharge: HOME | End: 2018-04-23
Attending: EMERGENCY MEDICINE
Payer: COMMERCIAL

## 2018-04-23 VITALS
HEART RATE: 75 BPM | BODY MASS INDEX: 22.19 KG/M2 | RESPIRATION RATE: 18 BRPM | OXYGEN SATURATION: 99 % | TEMPERATURE: 98 F | WEIGHT: 155 LBS | DIASTOLIC BLOOD PRESSURE: 65 MMHG | SYSTOLIC BLOOD PRESSURE: 121 MMHG | HEIGHT: 70 IN
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
## 2018-04-23 DIAGNOSIS — S06.0X0A CLOSED HEAD INJURY WITH CONCUSSION, WITHOUT LOSS OF CONSCIOUSNESS, INITIAL ENCOUNTER: ICD-10-CM

## 2018-04-23 DIAGNOSIS — R05.9 COUGH: ICD-10-CM

## 2018-04-23 LAB
B-HCG UR QL: NEGATIVE
CTP QC/QA: YES

## 2018-04-23 PROCEDURE — 81025 URINE PREGNANCY TEST: CPT | Performed by: EMERGENCY MEDICINE

## 2018-04-23 PROCEDURE — 96372 THER/PROPH/DIAG INJ SC/IM: CPT

## 2018-04-23 PROCEDURE — 99284 EMERGENCY DEPT VISIT MOD MDM: CPT | Mod: 25

## 2018-04-23 PROCEDURE — 25000003 PHARM REV CODE 250: Performed by: PHYSICIAN ASSISTANT

## 2018-04-23 PROCEDURE — 63600175 PHARM REV CODE 636 W HCPCS: Performed by: PHYSICIAN ASSISTANT

## 2018-04-23 RX ORDER — METHOCARBAMOL 500 MG/1
1000 TABLET, FILM COATED ORAL
Status: COMPLETED | OUTPATIENT
Start: 2018-04-23 | End: 2018-04-23

## 2018-04-23 RX ORDER — ONDANSETRON 4 MG/1
4 TABLET, ORALLY DISINTEGRATING ORAL
Status: COMPLETED | OUTPATIENT
Start: 2018-04-23 | End: 2018-04-23

## 2018-04-23 RX ORDER — METHOCARBAMOL 500 MG/1
1000 TABLET, FILM COATED ORAL 3 TIMES DAILY
Qty: 12 TABLET | Refills: 0 | Status: SHIPPED | OUTPATIENT
Start: 2018-04-23 | End: 2018-04-28

## 2018-04-23 RX ORDER — KETOROLAC TROMETHAMINE 30 MG/ML
30 INJECTION, SOLUTION INTRAMUSCULAR; INTRAVENOUS
Status: COMPLETED | OUTPATIENT
Start: 2018-04-23 | End: 2018-04-23

## 2018-04-23 RX ADMIN — KETOROLAC TROMETHAMINE 30 MG: 30 INJECTION, SOLUTION INTRAMUSCULAR at 06:04

## 2018-04-23 RX ADMIN — ONDANSETRON 4 MG: 4 TABLET, ORALLY DISINTEGRATING ORAL at 06:04

## 2018-04-23 RX ADMIN — METHOCARBAMOL 1000 MG: 500 TABLET ORAL at 06:04
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
## 2018-04-25 ENCOUNTER — HOSPITAL ENCOUNTER (EMERGENCY)
Facility: OTHER | Age: 23
Discharge: HOME | End: 2018-04-25
Attending: EMERGENCY MEDICINE
Payer: COMMERCIAL

## 2018-04-25 VITALS
HEIGHT: 70 IN | WEIGHT: 155 LBS | OXYGEN SATURATION: 100 % | TEMPERATURE: 98 F | SYSTOLIC BLOOD PRESSURE: 112 MMHG | BODY MASS INDEX: 22.19 KG/M2 | RESPIRATION RATE: 16 BRPM | HEART RATE: 68 BPM | DIASTOLIC BLOOD PRESSURE: 74 MMHG

## 2018-04-25 DIAGNOSIS — S06.0X0A CONCUSSION WITHOUT LOSS OF CONSCIOUSNESS, INITIAL ENCOUNTER: Primary | ICD-10-CM

## 2018-04-25 PROCEDURE — 99283 EMERGENCY DEPT VISIT LOW MDM: CPT

## 2018-04-25 PROCEDURE — 25000003 PHARM REV CODE 250: Performed by: PHYSICIAN ASSISTANT

## 2018-04-25 RX ORDER — BUTALBITAL, ACETAMINOPHEN AND CAFFEINE 50; 325; 40 MG/1; MG/1; MG/1
1 TABLET ORAL EVERY 4 HOURS PRN
Qty: 12 TABLET | Refills: 0 | Status: SHIPPED | OUTPATIENT
Start: 2018-04-25 | End: 2018-05-25

## 2018-04-25 RX ORDER — ONDANSETRON 4 MG/1
4 TABLET, ORALLY DISINTEGRATING ORAL
Status: COMPLETED | OUTPATIENT
Start: 2018-04-25 | End: 2018-04-25

## 2018-04-25 RX ORDER — ONDANSETRON 4 MG/1
4 TABLET, ORALLY DISINTEGRATING ORAL EVERY 8 HOURS PRN
Qty: 15 TABLET | Refills: 0 | Status: SHIPPED | OUTPATIENT
Start: 2018-04-25

## 2018-04-25 RX ADMIN — ONDANSETRON 4 MG: 4 TABLET, ORALLY DISINTEGRATING ORAL at 07:04
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
## 2020-03-30 NOTE — PROGRESS NOTES
Provider e-prescribed prescription to the pharmacy.    Pt crying, reports she thinks she has a fever. Temp 103.1, tylenol and ice chips given, cool cloth applied to forehead. MD notified. Will continue to monitor.